# Patient Record
Sex: MALE | Race: BLACK OR AFRICAN AMERICAN | NOT HISPANIC OR LATINO | URBAN - METROPOLITAN AREA
[De-identification: names, ages, dates, MRNs, and addresses within clinical notes are randomized per-mention and may not be internally consistent; named-entity substitution may affect disease eponyms.]

---

## 2019-11-06 ENCOUNTER — INPATIENT (INPATIENT)
Facility: HOSPITAL | Age: 49
LOS: 6 days | Discharge: HOME | DRG: 392 | End: 2019-11-13
Attending: HOSPITALIST | Admitting: HOSPITALIST
Payer: MEDICAID

## 2019-11-06 VITALS
RESPIRATION RATE: 18 BRPM | DIASTOLIC BLOOD PRESSURE: 88 MMHG | TEMPERATURE: 98 F | OXYGEN SATURATION: 100 % | SYSTOLIC BLOOD PRESSURE: 116 MMHG | HEART RATE: 90 BPM

## 2019-11-06 PROCEDURE — 71045 X-RAY EXAM CHEST 1 VIEW: CPT | Mod: 26

## 2019-11-06 PROCEDURE — 99285 EMERGENCY DEPT VISIT HI MDM: CPT

## 2019-11-06 RX ORDER — ASPIRIN/CALCIUM CARB/MAGNESIUM 324 MG
162 TABLET ORAL ONCE
Refills: 0 | Status: COMPLETED | OUTPATIENT
Start: 2019-11-06 | End: 2019-11-06

## 2019-11-06 RX ADMIN — Medication 162 MILLIGRAM(S): at 23:43

## 2019-11-06 NOTE — ED PROVIDER NOTE - CLINICAL SUMMARY MEDICAL DECISION MAKING FREE TEXT BOX
48M homeless hx uncontrolled htn p/w chest pressure transferred from [place] for STEMI eval. Cards consulted. repeat EKG more likely c/w jpoint elevation/early repol. Will get repeat trops, cxr, labs, likely a/m. Low suspicion for dissection Dr. George (Attending Physician)  48M homeless hx uncontrolled htn p/w chest pressure transferred from [place] for STEMI eval. Cards consulted. repeat EKG more likely c/w jpoint elevation/early repol. Will get repeat trops, cxr, labs, likely a/m. Low suspicion for dissection

## 2019-11-06 NOTE — ED PROVIDER NOTE - OBJECTIVE STATEMENT
48M hx htn (uncontrolled) tx from [facility] with STEMI consult. Per EMS, patient presnted to OSH with dizziness. Was found to have ST elevations in v2-v4 and had trop of .084. Was given nitro, lopressor and sent to [facility]. Given 2 baby asa en route. In the ER, patient reports mid chest pressure radiating to neck a/w nausea and sob. Reports occasional smoker but otherwise no drug use. Per EMS, patient is homeless.

## 2019-11-06 NOTE — ED ADULT NURSE NOTE - OBJECTIVE STATEMENT
48y Male A&Ox3 came by EMS from [facility] c/o chest pain. PMH of HTN.  Pt states he has had chest pain for 1 week and started to have worsening CP starting 3pm today with SOB and dizziness. Pt received Nitroglycerin, lopressor and Zofran at [place], Pt also got 2 ASA en route by EMS.  Pt presents with generalized burning 7/10 chest pain that radiates to back of neck and dizziness. Denies sob now, ha, n/v/d, abdominal pain, f/c, urinary symptoms, hematuria. Upon examination, full facial symmetry, no JVD or trach deviation, lung sounds clear and adequate chest rise, S1 and S2 heart sounds present, +2 pulses in all extremities with full ROM and equal strength, cap refill <2 seconds, ABD soft, non distended and non tender, ABD sounds present, pelvis intact, Pt reports normal bowel movement

## 2019-11-06 NOTE — ED PROVIDER NOTE - ATTENDING CONTRIBUTION TO CARE
Dr. George (Attending Physician)  I performed a history and physical exam of the patient and discussed their management with the resident. I reviewed the resident's note and agree with the documented findings and plan of care. My medical decision making and observations are found above.

## 2019-11-07 DIAGNOSIS — Z02.9 ENCOUNTER FOR ADMINISTRATIVE EXAMINATIONS, UNSPECIFIED: ICD-10-CM

## 2019-11-07 DIAGNOSIS — E78.5 HYPERLIPIDEMIA, UNSPECIFIED: ICD-10-CM

## 2019-11-07 DIAGNOSIS — K21.9 GASTRO-ESOPHAGEAL REFLUX DISEASE WITHOUT ESOPHAGITIS: ICD-10-CM

## 2019-11-07 DIAGNOSIS — R07.9 CHEST PAIN, UNSPECIFIED: ICD-10-CM

## 2019-11-07 DIAGNOSIS — N17.9 ACUTE KIDNEY FAILURE, UNSPECIFIED: ICD-10-CM

## 2019-11-07 DIAGNOSIS — I10 ESSENTIAL (PRIMARY) HYPERTENSION: ICD-10-CM

## 2019-11-07 DIAGNOSIS — Z98.890 OTHER SPECIFIED POSTPROCEDURAL STATES: Chronic | ICD-10-CM

## 2019-11-07 DIAGNOSIS — F10.10 ALCOHOL ABUSE, UNCOMPLICATED: ICD-10-CM

## 2019-11-07 DIAGNOSIS — Z29.9 ENCOUNTER FOR PROPHYLACTIC MEASURES, UNSPECIFIED: ICD-10-CM

## 2019-11-07 LAB
ALBUMIN SERPL ELPH-MCNC: 3.8 G/DL — SIGNIFICANT CHANGE UP (ref 3.3–5)
ALBUMIN SERPL ELPH-MCNC: 4 G/DL — SIGNIFICANT CHANGE UP (ref 3.3–5)
ALP SERPL-CCNC: 79 U/L — SIGNIFICANT CHANGE UP (ref 40–120)
ALP SERPL-CCNC: 86 U/L — SIGNIFICANT CHANGE UP (ref 40–120)
ALT FLD-CCNC: 22 U/L — SIGNIFICANT CHANGE UP (ref 10–45)
ALT FLD-CCNC: 28 U/L — SIGNIFICANT CHANGE UP (ref 10–45)
ANION GAP SERPL CALC-SCNC: 11 MMOL/L — SIGNIFICANT CHANGE UP (ref 5–17)
ANION GAP SERPL CALC-SCNC: 14 MMOL/L — SIGNIFICANT CHANGE UP (ref 5–17)
ANION GAP SERPL CALC-SCNC: 15 MMOL/L — SIGNIFICANT CHANGE UP (ref 5–17)
APTT BLD: 30.4 SEC — SIGNIFICANT CHANGE UP (ref 27.5–36.3)
AST SERPL-CCNC: 37 U/L — SIGNIFICANT CHANGE UP (ref 10–40)
AST SERPL-CCNC: 59 U/L — HIGH (ref 10–40)
BILIRUB DIRECT SERPL-MCNC: 0.1 MG/DL — SIGNIFICANT CHANGE UP (ref 0–0.2)
BILIRUB INDIRECT FLD-MCNC: 0.4 MG/DL — SIGNIFICANT CHANGE UP (ref 0.2–1)
BILIRUB SERPL-MCNC: 0.4 MG/DL — SIGNIFICANT CHANGE UP (ref 0.2–1.2)
BILIRUB SERPL-MCNC: 0.5 MG/DL — SIGNIFICANT CHANGE UP (ref 0.2–1.2)
BUN SERPL-MCNC: 10 MG/DL — SIGNIFICANT CHANGE UP (ref 7–23)
BUN SERPL-MCNC: 10 MG/DL — SIGNIFICANT CHANGE UP (ref 7–23)
BUN SERPL-MCNC: 9 MG/DL — SIGNIFICANT CHANGE UP (ref 7–23)
CALCIUM SERPL-MCNC: 8.8 MG/DL — SIGNIFICANT CHANGE UP (ref 8.4–10.5)
CALCIUM SERPL-MCNC: 8.9 MG/DL — SIGNIFICANT CHANGE UP (ref 8.4–10.5)
CALCIUM SERPL-MCNC: 9 MG/DL — SIGNIFICANT CHANGE UP (ref 8.4–10.5)
CHLORIDE SERPL-SCNC: 100 MMOL/L — SIGNIFICANT CHANGE UP (ref 96–108)
CHLORIDE SERPL-SCNC: 100 MMOL/L — SIGNIFICANT CHANGE UP (ref 96–108)
CHLORIDE SERPL-SCNC: 101 MMOL/L — SIGNIFICANT CHANGE UP (ref 96–108)
CHOLEST SERPL-MCNC: 181 MG/DL — SIGNIFICANT CHANGE UP (ref 10–199)
CO2 SERPL-SCNC: 25 MMOL/L — SIGNIFICANT CHANGE UP (ref 22–31)
CO2 SERPL-SCNC: 25 MMOL/L — SIGNIFICANT CHANGE UP (ref 22–31)
CO2 SERPL-SCNC: 26 MMOL/L — SIGNIFICANT CHANGE UP (ref 22–31)
CREAT SERPL-MCNC: 1.13 MG/DL — SIGNIFICANT CHANGE UP (ref 0.5–1.3)
CREAT SERPL-MCNC: 1.24 MG/DL — SIGNIFICANT CHANGE UP (ref 0.5–1.3)
CREAT SERPL-MCNC: 1.35 MG/DL — HIGH (ref 0.5–1.3)
ETHANOL SERPL-MCNC: 19 MG/DL — HIGH (ref 0–10)
GLUCOSE SERPL-MCNC: 106 MG/DL — HIGH (ref 70–99)
GLUCOSE SERPL-MCNC: 91 MG/DL — SIGNIFICANT CHANGE UP (ref 70–99)
GLUCOSE SERPL-MCNC: 91 MG/DL — SIGNIFICANT CHANGE UP (ref 70–99)
HCT VFR BLD CALC: 32.6 % — LOW (ref 39–50)
HDLC SERPL-MCNC: 48 MG/DL — SIGNIFICANT CHANGE UP
HGB BLD-MCNC: 11.5 G/DL — LOW (ref 13–17)
INR BLD: 1.03 RATIO — SIGNIFICANT CHANGE UP (ref 0.88–1.16)
LIDOCAIN IGE QN: 32 U/L — SIGNIFICANT CHANGE UP (ref 7–60)
LIPID PNL WITH DIRECT LDL SERPL: 116 MG/DL — HIGH
MAGNESIUM SERPL-MCNC: 2.1 MG/DL — SIGNIFICANT CHANGE UP (ref 1.6–2.6)
MAGNESIUM SERPL-MCNC: 2.1 MG/DL — SIGNIFICANT CHANGE UP (ref 1.6–2.6)
MCHC RBC-ENTMCNC: 33.6 PG — SIGNIFICANT CHANGE UP (ref 27–34)
MCHC RBC-ENTMCNC: 35.3 GM/DL — SIGNIFICANT CHANGE UP (ref 32–36)
MCV RBC AUTO: 95.3 FL — SIGNIFICANT CHANGE UP (ref 80–100)
NRBC # BLD: 0 /100 WBCS — SIGNIFICANT CHANGE UP (ref 0–0)
NT-PROBNP SERPL-SCNC: 209 PG/ML — SIGNIFICANT CHANGE UP (ref 0–300)
PHOSPHATE SERPL-MCNC: 2.3 MG/DL — LOW (ref 2.5–4.5)
PHOSPHATE SERPL-MCNC: 3.3 MG/DL — SIGNIFICANT CHANGE UP (ref 2.5–4.5)
PLATELET # BLD AUTO: 232 K/UL — SIGNIFICANT CHANGE UP (ref 150–400)
POTASSIUM SERPL-MCNC: 3.3 MMOL/L — LOW (ref 3.5–5.3)
POTASSIUM SERPL-MCNC: 3.6 MMOL/L — SIGNIFICANT CHANGE UP (ref 3.5–5.3)
POTASSIUM SERPL-MCNC: 4.3 MMOL/L — SIGNIFICANT CHANGE UP (ref 3.5–5.3)
POTASSIUM SERPL-SCNC: 3.3 MMOL/L — LOW (ref 3.5–5.3)
POTASSIUM SERPL-SCNC: 3.6 MMOL/L — SIGNIFICANT CHANGE UP (ref 3.5–5.3)
POTASSIUM SERPL-SCNC: 4.3 MMOL/L — SIGNIFICANT CHANGE UP (ref 3.5–5.3)
PROT SERPL-MCNC: 7.2 G/DL — SIGNIFICANT CHANGE UP (ref 6–8.3)
PROT SERPL-MCNC: 7.2 G/DL — SIGNIFICANT CHANGE UP (ref 6–8.3)
PROTHROM AB SERPL-ACNC: 11.8 SEC — SIGNIFICANT CHANGE UP (ref 10–12.9)
RBC # BLD: 3.42 M/UL — LOW (ref 4.2–5.8)
RBC # FLD: 13.8 % — SIGNIFICANT CHANGE UP (ref 10.3–14.5)
SODIUM SERPL-SCNC: 137 MMOL/L — SIGNIFICANT CHANGE UP (ref 135–145)
SODIUM SERPL-SCNC: 140 MMOL/L — SIGNIFICANT CHANGE UP (ref 135–145)
SODIUM SERPL-SCNC: 140 MMOL/L — SIGNIFICANT CHANGE UP (ref 135–145)
TOTAL CHOLESTEROL/HDL RATIO MEASUREMENT: 3.8 RATIO — SIGNIFICANT CHANGE UP (ref 3.4–9.6)
TRIGL SERPL-MCNC: 84 MG/DL — SIGNIFICANT CHANGE UP (ref 10–149)
TROPONIN T, HIGH SENSITIVITY RESULT: 26 NG/L — SIGNIFICANT CHANGE UP (ref 0–51)
TROPONIN T, HIGH SENSITIVITY RESULT: 28 NG/L — SIGNIFICANT CHANGE UP (ref 0–51)
TSH SERPL-MCNC: 3.33 UIU/ML — SIGNIFICANT CHANGE UP (ref 0.27–4.2)
WBC # BLD: 5.32 K/UL — SIGNIFICANT CHANGE UP (ref 3.8–10.5)
WBC # FLD AUTO: 5.32 K/UL — SIGNIFICANT CHANGE UP (ref 3.8–10.5)

## 2019-11-07 PROCEDURE — 99255 IP/OBS CONSLTJ NEW/EST HI 80: CPT

## 2019-11-07 PROCEDURE — 93306 TTE W/DOPPLER COMPLETE: CPT | Mod: 26

## 2019-11-07 PROCEDURE — 12345: CPT | Mod: NC

## 2019-11-07 PROCEDURE — 99223 1ST HOSP IP/OBS HIGH 75: CPT

## 2019-11-07 RX ORDER — POTASSIUM CHLORIDE 20 MEQ
40 PACKET (EA) ORAL ONCE
Refills: 0 | Status: COMPLETED | OUTPATIENT
Start: 2019-11-07 | End: 2019-11-07

## 2019-11-07 RX ORDER — THIAMINE MONONITRATE (VIT B1) 100 MG
100 TABLET ORAL DAILY
Refills: 0 | Status: COMPLETED | OUTPATIENT
Start: 2019-11-07 | End: 2019-11-09

## 2019-11-07 RX ORDER — FOLIC ACID 0.8 MG
1 TABLET ORAL DAILY
Refills: 0 | Status: DISCONTINUED | OUTPATIENT
Start: 2019-11-07 | End: 2019-11-11

## 2019-11-07 RX ORDER — ASPIRIN/CALCIUM CARB/MAGNESIUM 324 MG
81 TABLET ORAL DAILY
Refills: 0 | Status: DISCONTINUED | OUTPATIENT
Start: 2019-11-07 | End: 2019-11-12

## 2019-11-07 RX ORDER — PANTOPRAZOLE SODIUM 20 MG/1
40 TABLET, DELAYED RELEASE ORAL
Refills: 0 | Status: DISCONTINUED | OUTPATIENT
Start: 2019-11-07 | End: 2019-11-13

## 2019-11-07 RX ORDER — FAMOTIDINE 10 MG/ML
20 INJECTION INTRAVENOUS ONCE
Refills: 0 | Status: COMPLETED | OUTPATIENT
Start: 2019-11-07 | End: 2019-11-07

## 2019-11-07 RX ADMIN — Medication 100 MILLIGRAM(S): at 13:02

## 2019-11-07 RX ADMIN — Medication 40 MILLIEQUIVALENT(S): at 07:43

## 2019-11-07 RX ADMIN — Medication 81 MILLIGRAM(S): at 13:02

## 2019-11-07 RX ADMIN — Medication 30 MILLILITER(S): at 01:35

## 2019-11-07 RX ADMIN — PANTOPRAZOLE SODIUM 40 MILLIGRAM(S): 20 TABLET, DELAYED RELEASE ORAL at 07:43

## 2019-11-07 RX ADMIN — FAMOTIDINE 20 MILLIGRAM(S): 10 INJECTION INTRAVENOUS at 01:36

## 2019-11-07 RX ADMIN — Medication 1 TABLET(S): at 13:02

## 2019-11-07 RX ADMIN — Medication 1 MILLIGRAM(S): at 13:02

## 2019-11-07 RX ADMIN — Medication 40 MILLIEQUIVALENT(S): at 13:02

## 2019-11-07 NOTE — CHART NOTE - NSCHARTNOTEFT_GEN_A_CORE
History, labs, chart reviewed. Patient seen and examined. See full note for further details.    Tele strip reviewed: Since 11:30 PM, no tele events, has been in SR in the 80s.  EKG with no ischemic changes.  Cardiac enzymes not consistent with ACS.    Will d/c tele at this time.

## 2019-11-07 NOTE — ED ADULT NURSE REASSESSMENT NOTE - NS ED NURSE REASSESS COMMENT FT1
Rayray ORTA at bedside, MD states Pt doesn't need 4 AM Troponin since 2 AM was drawn.  Pt calm and cooperative

## 2019-11-07 NOTE — H&P ADULT - NSHPREVIEWOFSYSTEMS_GEN_ALL_CORE
CONSTITUTIONAL: No weakness, fevers or chills  EYES/ENT: No visual changes;  No dysphagia  NECK: No pain or stiffness  RESPIRATORY: No cough, wheezing, hemoptysis; No shortness of breath  CARDIOVASCULAR: No chest pain or palpitations; No lower extremity edema  EXTREMITIES: no le edema, cyanosis, clubbing  MUSCULOSKELETAL: no joint pain, swelling  GASTROINTESTINAL: +reflux; vomiting x3; no hematemesis; No diarrhea or constipation. No melena or hematochezia.  BACK: No back pain  GENITOURINARY: No dysuria, frequency or hematuria  NEUROLOGICAL: +dizziness  SKIN: No itching, burning, rashes, or lesions   PSYCH: no agitation  All other review of systems is negative unless indicated above.

## 2019-11-07 NOTE — CHART NOTE - NSCHARTNOTEFT_GEN_A_CORE
Patient seen and examined. History, labs, chart reviewed.    Currently patient reports left-sided/midsternal/epigastric "burning" pain. No nausea/vomiting. No palpitations. No dyspnea. Symptoms do not get worse with exertion and get better after eating. Reports daily EtOH use (1/2 pint vodka/day), with last drink 4 days ago (had positive EtOH level on arrival). States he has no history of cardiac stents and has been off all medications for some time.    On exam, he is hemodynamically stable and not tachycardic.  There is b/l pterygium noted on the eyes. No scleral icterus.  No JVD and and no carotid bruit.  Lungs are CTA b/l. There is no wheeze.  +S1S2. RRR. No m/g/r.  Abdomen soft, non-tender, non-distended. No rebound/guarding.  No clubbing, cyanosis or edema.  No tremor noted; moving all extremities.    Tele strip reviewed from 1130PM yesterday until now: SR without ectopy.  Cardiology evaluation noted. Plan of care discussed with cardiology f/u, Dr. Hand, as well.    A/P:  1. Chest Pain: Transferred from OSH for reported ST elevations on EKG and mild Troponin I elevation. Here, EKG without ischemic changes and hsTnT testing not consistent with ACS. Suspect that chest pain may be GI in nature given EtOH use and report of burning in chest that gets better after eating.  -D/C telemetry monitoring.  -Obtain TTE; history is suspicious for CHF, which may be non-ischemic in origin (?EtOH use).  -Continue ASA 81 mg PO daily for now, but question utility as this may be for primary prevention. Will need collateral information to see if there truly is a history of CAD.  -BP monitoring.  -Trial of PPI/Maalox    2. EtOH use: No signs of withdrawal at this time but given significant history of daily use, suspect his is at risk for such.  -Continue symptom-triggered CIWA monitoring.  -MVI/folate/B12.  -Social work evaluation. Pt also reportedly undomiciled.    3. Hypokalemia in setting of ethanol use  -Replete with KCl 40 mEq PO x 1 now.  -Repeat BMP; if persists, will check magnesium level as well.    4. DVT PPx: IMPROVE score is 0. Ambulation as tolerated. No role for pharmacologic DVT PPx at this time.

## 2019-11-07 NOTE — H&P ADULT - PROBLEM SELECTOR PLAN 6
low risk for DVT, hold off AC inconsistent history  start ciwa monitoring  check alcohol level  check lipase inconsistent history  start ciwa monitoring  check alcohol level  check lipase  thaimine/folate/MVI

## 2019-11-07 NOTE — H&P ADULT - PROBLEM SELECTOR PLAN 8
1.  Name of PCP: No PCP  2.  PCP Contacted on Admission: [ ] Y    [x ] N    3.  PCP contacted at Discharge: [ ] Y    [ ] N    [ ] N/A  4.  Post-Discharge Appointment Date and Location:  5.  Summary of Handoff given to PCP:

## 2019-11-07 NOTE — CONSULT NOTE ADULT - SUBJECTIVE AND OBJECTIVE BOX
All Cardiology service information can be found 24/7 on [email or web address], password: [email or web address]    Patient seen and evaluated at bedside    Chief Complaint: dizziness    HPI:  48 M w/ no known PMH initially presented to OSH with complaints of dizziness. EKG there revealed ROXANN in I and AVL w/ troponin I elevation of .08 and patient transferred for further eval. He reports chest pain burning in nature that upon further elucidation was localized to his epigastrium. Denies any palpitations or SOB. Drinks half a pint of vodka frequently although pt unable to further quantify. Lives in a shelter and does not obtain routine medical care    PMHx:   None    PSHx:   None    Allergies:  No Known Allergies      Home Meds: None    Current Medications:       FAMILY HISTORY: non-contributory      Social History:  +3-5 cigs/day, frequent drinker, denies other drug use    REVIEW OF SYSTEMS:  CONSTITUTIONAL: No fevers or chills  EYES/ENT: No visual changes;  No dysphagia  NECK: No pain or stiffness  RESPIRATORY: No cough, wheezing, hemoptysis; No shortness of breath  CARDIOVASCULAR: No chest pain or palpitations; No lower extremity edema  GASTROINTESTINAL: +epigastric discomfort   BACK: No back pain  GENITOURINARY: No dysuria, frequency or hematuria  NEUROLOGICAL: No numbness or weakness  SKIN: No itching, burning, rashes, or lesions   All other review of systems is negative unless indicated above.    Physical Exam:  T(F): --  HR: 90 (11-06) (90 - 90)  BP: --  RR: 18 (11-06)  SpO2: 100% (11-06)  GENERAL: No acute distress  HEAD:  Atraumatic, Normocephalic  ENT: Neck supple, no JVD  CHEST/LUNG: Clear to auscultation bilaterally; No wheeze, equal breath sounds bilaterally   BACK: No spinal tenderness  HEART: Regular rate and rhythm; No murmurs, rubs, or gallops  ABDOMEN: Soft, Nontender, Nondistended; Bowel sounds present  EXTREMITIES:  No clubbing, cyanosis, or edema  PSYCH: Nl behavior, nl affect  NEUROLOGY: non-focal, cranial nerves intact  SKIN: Normal color, No rashes or lesions  LINES:    Cardiovascular Diagnostic Testing:    ECG: Personally reviewed: NSR, LVH, repolarization abnormalities    Labs: Personally reviewed                        11.5 5.32  )-----------( 232      ( 06 Nov 2019 23:52 )             32.6     11-06    140  |  100  |  10  ----------------------------<  91  3.6   |  26  |  1.35<H>    Ca    9.0      06 Nov 2019 23:52    TPro  7.2  /  Alb  3.8  /  TBili  0.4  /  DBili  x   /  AST  37  /  ALT  22  /  AlkPhos  79  11-06    PT/INR - ( 06 Nov 2019 23:52 )   PT: 11.8 sec;   INR: 1.03 ratio         PTT - ( 06 Nov 2019 23:52 )  PTT:30.4 sec    CARDIAC MARKERS ( 06 Nov 2019 23:52 )  26 ng/L / x     / x     / x     / x     / x            Serum Pro-Brain Natriuretic Peptide: 209 pg/mL (11-06 @ 23:52) All Cardiology service information can be found 24/7 on [email or web address], password: [email or web address]    Patient seen and evaluated at bedside    Chief Complaint: dizziness    HPI:  48 M w/ no known PMH initially presented to OSH with complaints of dizziness. EKG there revealed ROXANN in I and AVL w/ troponin I elevation of .08 and patient transferred for further eval. He reports chest pain burning in nature that upon further elucidation was localized to his epigastrium. Denies any palpitations or SOB. Drinks half a pint of vodka frequently although pt unable to further quantify. Lives in a shelter and does not obtain routine medical care.    PMHx:   None    PSHx:   None    Allergies:  No Known Allergies    Home Meds: None    FAMILY HISTORY: non-contributory    Social History:  +3-5 cigs/day, frequent drinker, denies other drug use    REVIEW OF SYSTEMS:  CONSTITUTIONAL: No fevers or chills  EYES/ENT: No visual changes;  No dysphagia  NECK: No pain or stiffness  RESPIRATORY: No cough, wheezing, hemoptysis; No shortness of breath  CARDIOVASCULAR: No chest pain or palpitations; No lower extremity edema  GASTROINTESTINAL: +epigastric discomfort   BACK: No back pain  GENITOURINARY: No dysuria, frequency or hematuria  NEUROLOGICAL: No numbness or weakness  SKIN: No itching, burning, rashes, or lesions   All other review of systems is negative unless indicated above.    Physical Exam:  T(F): --  HR: 90 (11-06) (90 - 90)  BP: --  RR: 18 (11-06)  SpO2: 100% (11-06)  GENERAL: No acute distress  HEAD:  Atraumatic, Normocephalic  ENT: Neck supple, no JVD  CHEST/LUNG: Clear to auscultation bilaterally; No wheeze, equal breath sounds bilaterally   BACK: No spinal tenderness  HEART: Regular rate and rhythm; No murmurs, rubs, or gallops  ABDOMEN: Soft, Nontender, Nondistended; Bowel sounds present  EXTREMITIES:  No clubbing, cyanosis, or edema  PSYCH: Nl behavior, nl affect  NEUROLOGY: non-focal, cranial nerves intact  SKIN: Normal color, No rashes or lesions  LINES:    Cardiovascular Diagnostic Testing:    ECG: Personally reviewed: NSR, LVH, repolarization abnormalities    Labs: Personally reviewed                        11.5   5.32  )-----------( 232      ( 06 Nov 2019 23:52 )             32.6     11-06    140  |  100  |  10  ----------------------------<  91  3.6   |  26  |  1.35<H>    Ca    9.0      06 Nov 2019 23:52    TPro  7.2  /  Alb  3.8  /  TBili  0.4  /  DBili  x   /  AST  37  /  ALT  22  /  AlkPhos  79  11-06    PT/INR - ( 06 Nov 2019 23:52 )   PT: 11.8 sec;   INR: 1.03 ratio       PTT - ( 06 Nov 2019 23:52 )  PTT:30.4 sec    CARDIAC MARKERS ( 06 Nov 2019 23:52 )  26 ng/L / x     / x     / x     / x     / x        Serum Pro-brain Natriuretic Peptide: 209 pg/mL (11-06 @ 23:52)

## 2019-11-07 NOTE — H&P ADULT - PROBLEM SELECTOR PLAN 1
Patient with atypical chest pain, possibly dyspepsia vs ACS  hstrop 26 --> 28  monitor on telemetry for now  start maalox and protonix for now  check echocardiogram  hold off stress test for now per cardiology  will try to obtain outpatient records  check alcohol level and lipase  start aspirin for now  check lipid panel, A1c, TSH Patient with atypical chest pain, possibly dyspepsia vs ACS vs. Pancreatitis (?alcohol use)  hstrop 26 --> 28  monitor on telemetry for now  start maalox and protonix for now  check echocardiogram  hold off stress test for now per cardiology  will try to obtain outpatient records  check alcohol level and lipase  start aspirin for now  check lipid panel, A1c, TSH

## 2019-11-07 NOTE — H&P ADULT - ATTENDING COMMENTS
Patient assigned to me by night hospitalist in charge for management and care for patient for this evening only. Care to be resumed by day hospitalist in the morning and thereafter.     Patient's care rendered on 11/07/19 at 4AM.      I was physically present for the key portions of the evaluation and management (E/M) service provided.  I agree with the above history, physical, and plan which I have reviewed and edited where appropriate.     Plan discussed with Patient, RN, Cardiology Fellow.

## 2019-11-07 NOTE — H&P ADULT - PROBLEM SELECTOR PLAN 7
1.  Name of PCP: No PCP  2.  PCP Contacted on Admission: [ ] Y    [x ] N    3.  PCP contacted at Discharge: [ ] Y    [ ] N    [ ] N/A  4.  Post-Discharge Appointment Date and Location:  5.  Summary of Handoff given to PCP: low risk for DVT, hold off AC

## 2019-11-07 NOTE — H&P ADULT - HISTORY OF PRESENT ILLNESS
47 yo male with PMH of HTN, Reflux, ?CAD, ?CHF, presents here with chest pain.  Patient is no a reliable historian, providing conflicting histories.  Patient states that he was admitted to [facility] 6 months ago for chest pain, had echo, stress test and cardiac cath, all of which were normal.  He was apparently discharged with five different medication because his hear "does not pump well" and "does not beat properly".  Four months ago he ran out of medication.  Currently he is unemployed and lives in a shelter.  He states that for last 1-2 months he has been having intermittent chest pain.  He describes it as "burning" type sensation, radiating to right side of his neck.  He says symptoms occur randomly at rest and with activities.  He also has SOB with activities, sleeps on 1 pillow, occasionally wakes up from sleep 2/2 SOB.  Denies any LE edema.  For last three days he has been having dizziness, had a total of 3 episodes of vomiting.  He went to "[facility]" and then he was sent here for positive troponin and ROXANN AVL and I.  On arrival, patient still complained of same symptoms.  On my evaluation, patient was resting comfortably, but when asked, he says he is still having same symptoms.  Per cardiology, he says he drinks regularly, last drink was 3 days ago, however he told me his last drink was two weeks ago. 47 yo male with PMH of HTN, Reflux, ?CAD, ?CHF, presents here with chest pain.  Patient is not a reliable historian, providing conflicting histories.  Patient states that he was admitted to [facility] 6 months ago for chest pain, had echo, stress test and cardiac cath, all of which were normal.  He was apparently discharged with five different medication because his heart "does not pump well" and "does not beat properly".  Four months ago he ran out of medication.  Currently he is unemployed and lives in a shelter.  He states that for last 1-2 months he has been having intermittent chest pain.  He describes it as "burning" type sensation, radiating to right side of his neck.  He says symptoms occur randomly at rest and with activities.  Denies associated diaphoresis.  He also has SOB with activities, sleeps on 1 pillow, occasionally wakes up from sleep 2/2 SOB.  Denies any LE edema.  For last three days he has been having dizziness, had a total of 3 episodes of vomiting.  He went to "[facility]" and then he was sent here for positive troponin and ROXANN AVL and I.  On arrival, patient still complained of same symptoms.  On my evaluation, patient was resting comfortably, but when asked, he says he is still having same symptoms.  Per cardiology, he says he drinks regularly, last drink was 3 days ago, however he told me his last drink was two weeks ago.  Otherwise, patient denies SOB, cough, fever.

## 2019-11-07 NOTE — CONSULT NOTE ADULT - ASSESSMENT
48 M w/ no known PMH presented w/ dizziness transferred for STEMI eval although likely repolarization abnormalities.    #Dizziness  -May be related to his alcohol use rather than cardiogenic. Check TTE  -Doubt acute ischemic process    #Epigastric discomfort  -Consider GI cocktail, atypical symptoms for angina  -Trend troponin 1 more set    Victor M Hand PGY4  [phone]  All Cardiology service information can be found 24/7 on [email or web address], password: [email or web address]

## 2019-11-07 NOTE — H&P ADULT - NSHPPHYSICALEXAM_GEN_ALL_CORE
PHYSICAL EXAM:  Vital Signs Last 24 Hrs  T(C): 36.8 (11-07-19 @ 02:14)  T(F): 98.2 (11-07-19 @ 02:14), Max: 98.4 (11-06-19 @ 23:20)  HR: 88 (11-07-19 @ 02:14) (88 - 90)  BP: 113/87 (11-07-19 @ 02:14)  BP(mean): --  RR: 18 (11-07-19 @ 02:14) (18 - 18)  SpO2: 97% (11-07-19 @ 02:14) (97% - 100%)  Wt(kg): --    Constitutional: NAD, awake and alert  EYES: EOMI; left eye conjunctival injection (chronic)  ENT:  Normal Hearing, no tonsillar exudates   Neck: Soft and supple, No JVD  Lungs: Breath sounds are clear bilaterally, No wheezing, rales or rhonchi  Heart: S1 and S2, regular rate and rhythm, no Murmurs, gallops or rubs  Abdomen: Bowel Sounds present, soft, nontender, nondistended, no guarding, no rebound  Extremities: No cyanosis or clubbing; warm to touch  Vascular: 2+ peripheral pulses lower ex  Neurological: A/O x 3, no focal deficits  Musculoskeletal: 5/5 strength b/l upper and lower extremities  Skin: No rashes  Psych: no depression or anhedonia  HEME: no bruises, no nose bleeds

## 2019-11-07 NOTE — H&P ADULT - NSHPLABSRESULTS_GEN_ALL_CORE
Labs personally reviewed:                          11.5   5.32  )-----------( 232      ( 06 Nov 2019 23:52 )             32.6     11-06    140  |  100  |  10  ----------------------------<  91  3.6   |  26  |  1.35<H>    Ca    9.0      06 Nov 2019 23:52    TPro  7.2  /  Alb  3.8  /  TBili  0.4  /  DBili  x   /  AST  37  /  ALT  22  /  AlkPhos  79  11-06        LIVER FUNCTIONS - ( 06 Nov 2019 23:52 )  Alb: 3.8 g/dL / Pro: 7.2 g/dL / ALK PHOS: 79 U/L / ALT: 22 U/L / AST: 37 U/L / GGT: x           PT/INR - ( 06 Nov 2019 23:52 )   PT: 11.8 sec;   INR: 1.03 ratio         PTT - ( 06 Nov 2019 23:52 )  PTT:30.4 sec    CAPILLARY BLOOD GLUCOSE          Imaging:  CXR personally reviewed: no focal opacity    EKG personally reviewed: nsr, ?early repolarization, LVH

## 2019-11-07 NOTE — ED ADULT NURSE REASSESSMENT NOTE - NS ED NURSE REASSESS COMMENT FT1
Pt states his chest pain is now a 2/10, medications gave relief. Pt admitted to Telemetry, Troponin collected and sent.  VSS

## 2019-11-07 NOTE — CONSULT NOTE ADULT - SUBJECTIVE AND OBJECTIVE BOX
MRN-[id number]    CHIEF COMPLAINT:  Patient is a 48y old  Male who presents with a chief complaint of chest pain and dizziness (07 Nov 2019 04:24)    NOT cardiac in nature. Treat alternative sources of discomfort; highly suspicious for GI. No plans for ischemic workup or stress testing. ECHO reviewed. Discharge with outpatient follow up when other medical and social issues have been addressed. Complete note to follow.     HISTORY OF PRESENT ILLNESS:  BELKYS MORAN is a 48y Male patient with past medical history of *** presenting with ***.     Allergies    No Known Allergies    PAST MEDICAL & SURGICAL HISTORY:  Acid reflux disease  HLD (hyperlipidemia)  HTN (hypertension)  H/O coronary angiogram      FAMILY HISTORY:  FH: heart disease: father @age 70      SOCIAL HISTORY:    [ ] Non-smoker  [ ] Smoker  [ ] Alcohol    REVIEW OF SYSTEMS:  CONSTITUTIONAL: No fever, weight loss, or fatigue  EYES: No eye pain, visual disturbances, or discharge  ENMT:  No difficulty hearing, tinnitus, vertigo; No sinus or throat pain  NECK: No pain or stiffness  RESPIRATORY: No cough, wheezing, chills or hemoptysis; No Shortness of Breath  CARDIOVASCULAR: No chest pain, palpitations, passing out, dizziness, or leg swelling  GASTROINTESTINAL: No abdominal or epigastric pain. No nausea, vomiting, or hematemesis; No diarrhea or constipation. No melena or hematochezia.  GENITOURINARY: No dysuria, frequency, hematuria, or incontinence  NEUROLOGICAL: No headaches, memory loss, loss of strength, numbness, or tremors  SKIN: No itching, burning, rashes, or lesions   LYMPH Nodes: No enlarged glands  ENDOCRINE: No heat or cold intolerance; No hair loss  MUSCULOSKELETAL: No joint pain or swelling; No muscle, back, or extremity pain  PSYCHIATRIC: No depression, anxiety, mood swings, or difficulty sleeping  HEME/LYMPH: No easy bruising, or bleeding gums  ALLERY AND IMMUNOLOGIC: No hives or eczema	    [ ] All others negative	  [ ] Unable to obtain    I&O's Summary      PHYSICAL EXAM:  Vital Signs Last 24 Hrs  T(C): 36.7 (07 Nov 2019 15:19), Max: 37.2 (07 Nov 2019 11:39)  T(F): 98.1 (07 Nov 2019 15:19), Max: 99 (07 Nov 2019 11:39)  HR: 102 (07 Nov 2019 15:19) (83 - 102)  BP: 135/87 (07 Nov 2019 15:19) (113/87 - 146/98)  BP(mean): --  RR: 18 (07 Nov 2019 15:19) (16 - 18)  SpO2: 98% (07 Nov 2019 15:19) (96% - 100%)  Appearance: Normal	  HEENT:   Normal oral mucosa, PERRL, EOMI	  Lymphatic: No lymphadenopathy  Cardiovascular: Normal S1 S2, No JVD, No murmurs, No edema  Respiratory: Lungs clear to auscultation	  Psychiatry: A & O x 3, Mood & affect appropriate  Gastrointestinal:  Soft, Non-tender, + BS	  Skin: No rashes, No ecchymoses, No cyanosis	  Neurologic: Non-focal  Extremities: Normal range of motion, No clubbing, cyanosis or edema  Vascular: Peripheral pulses palpable 2+ bilaterally    MEDICATIONS:  MEDICATIONS  (STANDING):  aspirin enteric coated 81 milliGRAM(s) Oral daily  folic acid 1 milliGRAM(s) Oral daily  multivitamin 1 Tablet(s) Oral daily  pantoprazole    Tablet 40 milliGRAM(s) Oral before breakfast  thiamine 100 milliGRAM(s) Oral daily    MEDICATIONS  (PRN):  aluminum hydroxide/magnesium hydroxide/simethicone Suspension 30 milliLiter(s) Oral every 6 hours PRN Dyspepsia  LORazepam     Tablet 2 milliGRAM(s) Oral every 2 hours PRN Symptom-triggered 2 point increase in CIWA-Ar  LORazepam     Tablet 2 milliGRAM(s) Oral every 1 hour PRN Symptom-triggered: each CIWA -Ar score 8 or GREATER      Home Medications:      LABS:	 	  CBC Full  -  ( 06 Nov 2019 23:52 )  WBC Count : 5.32 K/uL  Hemoglobin : 11.5 g/dL  Hematocrit : 32.6 %  Platelet Count - Automated : 232 K/uL  Mean Cell Volume : 95.3 fl  Mean Cell Hemoglobin : 33.6 pg  Mean Cell Hemoglobin Concentration : 35.3 gm/dL  Auto Neutrophil # : x  Auto Lymphocyte # : x  Auto Monocyte # : x  Auto Eosinophil # : x  Auto Basophil # : x  Auto Neutrophil % : x  Auto Lymphocyte % : x  Auto Monocyte % : x  Auto Eosinophil % : x  Auto Basophil % : x    11-07    137  |  101  |  10  ----------------------------<  106<H>  4.3   |  25  |  1.24  11-07    140  |  100  |  9   ----------------------------<  91  3.3<L>   |  25  |  1.13    Ca    8.8      07 Nov 2019 14:40  Ca    8.9      07 Nov 2019 05:28  Phos  2.3     11-07  Phos  3.3     11-07  Mg     2.1     11-07  Mg     2.1     11-07    TPro  7.2  /  Alb  4.0  /  TBili  0.5  /  DBili  0.1  /  AST  59<H>  /  ALT  28  /  AlkPhos  86  11-07  TPro  7.2  /  Alb  3.8  /  TBili  0.4  /  DBili  x   /  AST  37  /  ALT  22  /  AlkPhos  79  11-06    PT/INR - ( 06 Nov 2019 23:52 )   PT: 11.8 sec;   INR: 1.03 ratio         PTT - ( 06 Nov 2019 23:52 )  PTT:30.4 sec        proBNP:   Lipid Profile:   HgA1c:   TSH:     TELEMETRY: 	    ECG:  	  RADIOLOGY:  OTHER: 	    CARDIAC TESTING/STUDIES:    [ ] Echocardiogram:  [ ]  Catheterization:  [ ] Stress Test:  	  	  ASSESSMENT/PLAN: 	  NOT cardiac in nature. Treat alternative sources of discomfort; highly suspicious for GI No plans for ischemic workup or stress testing. ECHO reviewed. Discharge with outpatient follow up when other medical and social issus have been addressed. Complete note to follow.     Leena Stern MD, MPH, FILIBERTO  Cardiovascular Specialist Attending  [facility]  C: [phone]  E: [email or web address]

## 2019-11-07 NOTE — CONSULT NOTE ADULT - ATTENDING COMMENTS
48 M w/ no known PMH presented w/ dizziness while intoxicated transferred for STEMI eval although likely repolarization abnormalities and atypical chest discomfort. Treat alternative sources of discomfort; highly suspicious for GI. No plans for ischemic workup or stress testing. ECHO reviewed. Discharge with outpatient follow up when other medical and social issues have been addressed.    Leena Stern MD, MPH, FILIBERTO, RPVI, FACC  Cardiovascular Specialist   [facility]  c: [phone]  e: [email or web address]

## 2019-11-08 ENCOUNTER — TRANSCRIPTION ENCOUNTER (OUTPATIENT)
Age: 49
End: 2019-11-08

## 2019-11-08 LAB
ANION GAP SERPL CALC-SCNC: 11 MMOL/L — SIGNIFICANT CHANGE UP (ref 5–17)
BUN SERPL-MCNC: 13 MG/DL — SIGNIFICANT CHANGE UP (ref 7–23)
CALCIUM SERPL-MCNC: 8.9 MG/DL — SIGNIFICANT CHANGE UP (ref 8.4–10.5)
CHLORIDE SERPL-SCNC: 100 MMOL/L — SIGNIFICANT CHANGE UP (ref 96–108)
CO2 SERPL-SCNC: 24 MMOL/L — SIGNIFICANT CHANGE UP (ref 22–31)
CREAT SERPL-MCNC: 1.1 MG/DL — SIGNIFICANT CHANGE UP (ref 0.5–1.3)
GLUCOSE SERPL-MCNC: 99 MG/DL — SIGNIFICANT CHANGE UP (ref 70–99)
MAGNESIUM SERPL-MCNC: 2 MG/DL — SIGNIFICANT CHANGE UP (ref 1.6–2.6)
PHOSPHATE SERPL-MCNC: 2.8 MG/DL — SIGNIFICANT CHANGE UP (ref 2.5–4.5)
POTASSIUM SERPL-MCNC: 4 MMOL/L — SIGNIFICANT CHANGE UP (ref 3.5–5.3)
POTASSIUM SERPL-SCNC: 4 MMOL/L — SIGNIFICANT CHANGE UP (ref 3.5–5.3)
SODIUM SERPL-SCNC: 135 MMOL/L — SIGNIFICANT CHANGE UP (ref 135–145)

## 2019-11-08 PROCEDURE — 99232 SBSQ HOSP IP/OBS MODERATE 35: CPT

## 2019-11-08 RX ORDER — ACETAMINOPHEN 500 MG
650 TABLET ORAL ONCE
Refills: 0 | Status: COMPLETED | OUTPATIENT
Start: 2019-11-08 | End: 2019-11-08

## 2019-11-08 RX ADMIN — Medication 1 TABLET(S): at 12:32

## 2019-11-08 RX ADMIN — Medication 650 MILLIGRAM(S): at 21:11

## 2019-11-08 RX ADMIN — PANTOPRAZOLE SODIUM 40 MILLIGRAM(S): 20 TABLET, DELAYED RELEASE ORAL at 07:02

## 2019-11-08 RX ADMIN — Medication 650 MILLIGRAM(S): at 22:00

## 2019-11-08 RX ADMIN — Medication 1 MILLIGRAM(S): at 12:33

## 2019-11-08 RX ADMIN — Medication 100 MILLIGRAM(S): at 12:33

## 2019-11-08 RX ADMIN — Medication 81 MILLIGRAM(S): at 12:32

## 2019-11-08 NOTE — PROGRESS NOTE ADULT - SUBJECTIVE AND OBJECTIVE BOX
Patient is a 48y old  Male who presents with a chief complaint of chest pain and dizziness (07 Nov 2019 17:59)      SUBJECTIVE / OVERNIGHT EVENTS:    MEDICATIONS  (STANDING):  aspirin enteric coated 81 milliGRAM(s) Oral daily  folic acid 1 milliGRAM(s) Oral daily  multivitamin 1 Tablet(s) Oral daily  pantoprazole    Tablet 40 milliGRAM(s) Oral before breakfast  thiamine 100 milliGRAM(s) Oral daily    MEDICATIONS  (PRN):  aluminum hydroxide/magnesium hydroxide/simethicone Suspension 30 milliLiter(s) Oral every 6 hours PRN Dyspepsia  LORazepam     Tablet 2 milliGRAM(s) Oral every 2 hours PRN Symptom-triggered 2 point increase in CIWA-Ar  LORazepam     Tablet 2 milliGRAM(s) Oral every 1 hour PRN Symptom-triggered: each CIWA -Ar score 8 or GREATER      Vital Signs Last 24 Hrs  T(C): 36.7 (08 Nov 2019 07:56), Max: 37.2 (07 Nov 2019 11:39)  T(F): 98 (08 Nov 2019 07:56), Max: 99 (07 Nov 2019 11:39)  HR: 84 (08 Nov 2019 07:56) (84 - 104)  BP: 136/80 (08 Nov 2019 07:56) (119/76 - 147/93)  BP(mean): --  RR: 18 (08 Nov 2019 07:56) (16 - 18)  SpO2: 97% (08 Nov 2019 07:56) (96% - 99%)  CAPILLARY BLOOD GLUCOSE        I&O's Summary    07 Nov 2019 07:01  -  08 Nov 2019 07:00  --------------------------------------------------------  IN: 500 mL / OUT: 0 mL / NET: 500 mL        PHYSICAL EXAM:  GENERAL: NAD, well-developed  HEAD:  Atraumatic, Normocephalic  EYES: EOMI, PERRLA, conjunctiva and sclera clear  NECK: Supple, No JVD  CHEST/LUNG: Clear to auscultation bilaterally; No wheeze  HEART: Regular rate and rhythm; No murmurs, rubs, or gallops  ABDOMEN: Soft, Nontender, Nondistended; Bowel sounds present  EXTREMITIES:  2+ Peripheral Pulses, No clubbing, cyanosis, or edema  PSYCH: AAOx3  NEUROLOGY: non-focal  SKIN: No rashes or lesions    LABS:                        11.5   5.32  )-----------( 232      ( 06 Nov 2019 23:52 )             32.6     11-08    135  |  100  |  13  ----------------------------<  99  4.0   |  24  |  1.10    Ca    8.9      08 Nov 2019 07:47  Phos  2.8     11-08  Mg     2.0     11-08    TPro  7.2  /  Alb  4.0  /  TBili  0.5  /  DBili  0.1  /  AST  59<H>  /  ALT  28  /  AlkPhos  86  11-07    PT/INR - ( 06 Nov 2019 23:52 )   PT: 11.8 sec;   INR: 1.03 ratio         PTT - ( 06 Nov 2019 23:52 )  PTT:30.4 sec          RADIOLOGY & ADDITIONAL TESTS:    Imaging Personally Reviewed:    Consultant(s) Notes Reviewed:      Care Discussed with Consultants/Other Providers: Patient is a 48y old  Male who presents with a chief complaint of chest pain and dizziness (07 Nov 2019 17:59)      SUBJECTIVE / OVERNIGHT EVENTS:  Pt seen and examined. No acute events overnight. Reports that he feels well. Denies cp, sob, dizziness. Seen by Cardiology ; reccs appreciated.     MEDICATIONS  (STANDING):  aspirin enteric coated 81 milliGRAM(s) Oral daily  folic acid 1 milliGRAM(s) Oral daily  multivitamin 1 Tablet(s) Oral daily  pantoprazole    Tablet 40 milliGRAM(s) Oral before breakfast  thiamine 100 milliGRAM(s) Oral daily    MEDICATIONS  (PRN):  aluminum hydroxide/magnesium hydroxide/simethicone Suspension 30 milliLiter(s) Oral every 6 hours PRN Dyspepsia  LORazepam     Tablet 2 milliGRAM(s) Oral every 2 hours PRN Symptom-triggered 2 point increase in CIWA-Ar  LORazepam     Tablet 2 milliGRAM(s) Oral every 1 hour PRN Symptom-triggered: each CIWA -Ar score 8 or GREATER      Vital Signs Last 24 Hrs  T(C): 36.7 (08 Nov 2019 07:56), Max: 37.2 (07 Nov 2019 11:39)  T(F): 98 (08 Nov 2019 07:56), Max: 99 (07 Nov 2019 11:39)  HR: 84 (08 Nov 2019 07:56) (84 - 104)  BP: 136/80 (08 Nov 2019 07:56) (119/76 - 147/93)  BP(mean): --  RR: 18 (08 Nov 2019 07:56) (16 - 18)  SpO2: 97% (08 Nov 2019 07:56) (96% - 99%)  CAPILLARY BLOOD GLUCOSE        I&O's Summary    07 Nov 2019 07:01  -  08 Nov 2019 07:00  --------------------------------------------------------  IN: 500 mL / OUT: 0 mL / NET: 500 mL        PHYSICAL EXAM:  GENERAL: NAD, anicteric, afebrile  HEAD:  Atraumatic, Normocephalic  EYES: EOMI, PERRLA, conjunctiva and sclera clear  NECK: Supple, No JVD  CHEST/LUNG: Clear to auscultation bilaterally; No wheeze  HEART: Regular rate and rhythm; No murmurs, rubs, or gallops  ABDOMEN: Soft, Nontender, Nondistended; Bowel sounds present  EXTREMITIES:  2+ Peripheral Pulses, No clubbing, cyanosis, or edema  PSYCH: AAOx3  NEUROLOGY: non-focal, no tremors noted  SKIN: No rashes or lesions    LABS:                        11.5   5.32  )-----------( 232      ( 06 Nov 2019 23:52 )             32.6     11-08    135  |  100  |  13  ----------------------------<  99  4.0   |  24  |  1.10    Ca    8.9      08 Nov 2019 07:47  Phos  2.8     11-08  Mg     2.0     11-08    TPro  7.2  /  Alb  4.0  /  TBili  0.5  /  DBili  0.1  /  AST  59<H>  /  ALT  28  /  AlkPhos  86  11-07    PT/INR - ( 06 Nov 2019 23:52 )   PT: 11.8 sec;   INR: 1.03 ratio         PTT - ( 06 Nov 2019 23:52 )  PTT:30.4 sec            Consultant(s) Notes Reviewed:  Cardiology

## 2019-11-08 NOTE — DISCHARGE NOTE PROVIDER - NSDCCPCAREPLAN_GEN_ALL_CORE_FT
PRINCIPAL DISCHARGE DIAGNOSIS  Diagnosis: Chest pain  Assessment and Plan of Treatment: atypical chest pain; seen by cardiology      SECONDARY DISCHARGE DIAGNOSES  Diagnosis: Alcohol abuse  Assessment and Plan of Treatment: Alcohol abuse  follow up by  PRINCIPAL DISCHARGE DIAGNOSIS  Diagnosis: Chest pain  Assessment and Plan of Treatment: atypical chest pain; seen by cardiology  Work up was negative      SECONDARY DISCHARGE DIAGNOSES  Diagnosis: Alcohol abuse  Assessment and Plan of Treatment: Alcohol abuse  follow up by  PRINCIPAL DISCHARGE DIAGNOSIS  Diagnosis: Chest pain  Assessment and Plan of Treatment: atypical chest pain; seen by cardiology  Work up was negative  Continue aspirin      SECONDARY DISCHARGE DIAGNOSES  Diagnosis: Alcohol abuse  Assessment and Plan of Treatment: Alcohol abuse  follow up by  PRINCIPAL DISCHARGE DIAGNOSIS  Diagnosis: Chest pain  Assessment and Plan of Treatment: atypical chest pain; seen by cardiology  Work up was negative        SECONDARY DISCHARGE DIAGNOSES  Diagnosis: Alcohol abuse  Assessment and Plan of Treatment: Alcohol abuse  follow up by  PRINCIPAL DISCHARGE DIAGNOSIS  Diagnosis: Chest pain  Assessment and Plan of Treatment: Atypical chest pain; seen by cardiology  Work up was negative.        SECONDARY DISCHARGE DIAGNOSES  Diagnosis: HTN (hypertension)  Assessment and Plan of Treatment: APPOINTMENT MADE FOR YOU AT [facility] ON FRIDAY, NOVEMBER 22ND AT 10 AM.  Follow up with your medical doctor for routine blood pressure monitoring, and to establish long term blood pressure treatment goals.  Low salt diet  Activity as tolerated.  Notify your doctor if you have any of the following symptoms:   Dizziness, Lightheadedness, Blurry vision, Headache, Chest pain, Shortness of breath      Diagnosis: HLD (hyperlipidemia)  Assessment and Plan of Treatment:   Follow up with your medical doctor for routine blood  work monitoring, and to establish long term treatment goals.      Diagnosis: Alcohol abuse  Assessment and Plan of Treatment: Alcohol abuse  Follow up withj   for resources for Alcohol Abstinence..

## 2019-11-08 NOTE — DISCHARGE NOTE PROVIDER - HOSPITAL COURSE
49 yo male with PMH of HTN, Reflux  presents here with chest pain.     Transferred from OSH for reported ST elevations on EKG and mild Troponin I elevation. Here, EKG without ischemic changes and hsTnT testing not consistent with ACS. Suspect that chest pain may be GI in n highly suspicious for GI No plans for ischemic workup or stress testing.     seen by cardiology-    -D/C telemetry monitoring.    seen by ASHA 47 yo male with PMH of HTN, Reflux  presents here with chest pain.     Transferred from OSH for reported ST elevations on EKG and mild Troponin I elevation. Here, EKG without ischemic changes and hsTnT testing not consistent with ACS. Suspect that chest pain may be GI in n highly suspicious for GI No plans for ischemic workup or stress testing.     seen by cardiology-Echo was wnl. D/C telemetry monitoring.    seen by ASHA. Pt to f/u with Medicine clinic in [facility]. 47 yo male with PMH of HTN, Reflux  who presented  with chest pain.     Transferred from OSH for reported ST elevations on EKG and mild Troponin I elevation. Here, EKG without ischemic changes and hsTnT testing not consistent with ACS.     Suspect that chest pain may be GI related and  highly suspicious for GI. Seen by Cardiology-Echo was wnl. D/C telemetry monitoring.    No plans for ischemic workup or stress testing.     Seen by SW. Pt to f/u with Medicine Clinic in [facility] outpatient.     Pt stable for discharge per Attending Dr Rod. 47 yo male with PMH of HTN, Reflux, ?CAD, presents here with chest pain - ruled out cardiac etiology, may be secondary to GERD, no further cardiac work up, for d/c to shelter today    Chest pain: - CP resolved    - does not appear to be cardiac in nature  ; no need for tele, no ischemic w/up indicated    - hstrop 26 --> 28    - no EKG changes    - likely GI source ; c/w maalox and protonix     - TTE reviewed    - Cardiology reccs noted    d/c ASA as unnecessary for primary prevention.     TAHMINA (acute kidney injury): likely prerenal, now resolved    monitor BMP for now    avoid nephrotoxic agents.     HTN (hypertension): BP stable.     HLD (hyperlipidemia): lipid panel wnl.     Acid reflux disease: c/w maalox and protonix.     Alcohol abuse: inconsistent history    c/w ciwa monitoring    alcohol level 19    thaimine/folate/MVI.    Prophylactic measure: low risk for DVT, hold off AC.     Discharge planning issues: Seen by ASHA. d/c to [facility] today. Pt to f/u with Medicine Clinic in [facility] outpatient.     Appointment made with [address] [place] [address] medicine clinic    for November 22nd at 10 am. Appointment obtained from Dorys at Med Clinic on 11/13.     Pt stable for discharge per Attending Dr Rod. 49 yo male with PMH of HTN, Reflux, presents with chest pain, TAHMINA likely pre-renal azotemia and hypophasphatemia. Cardiac etiology ruled out, likely secondary to GERD, no further cardiac work up, for d/c to shelter today    Chest pain: - CP resolved    - does not appear to be cardiac in nature  ; no need for tele, no ischemic w/up indicated    - hstrop 26 --> 28    - no EKG changes    - likely GI source ; c/w maalox and protonix     - TTE reviewed - wnl    d/c ASA as unnecessary for primary prevention.     TAHMIAN (acute kidney injury) and hypophosphatemia likely prerenal, malnutrition and alcohol abuse. Resolved    avoid nephrotoxic agents.     HTN (hypertension): BP stable.     HLD (hyperlipidemia): lipid panel wnl.     Acid reflux disease: c/w maalox and protonix.     Alcohol abuse: inconsistent history    c/w ciwa monitoring    alcohol level 19    thaimine/folate/MVI.    Discharge planning issues: Seen by SW. d/c to [facility] today. Pt to f/u with Medicine Clinic in [facility].     Appointment made with [address] [place] [address] medicine clinic. Rx provided at [facility].    for November 22nd at 10 am. Appointment obtained from Dorys at [facility] on 11/13.

## 2019-11-08 NOTE — DISCHARGE NOTE PROVIDER - PROVIDER TOKENS
FREE:[LAST:[Medicine clinic],PHONE:[phone]],FAX:[(   )    -]] FREE:[LAST:[[facility]],PHONE:[phone]],FAX:[(   )    -],ADDRESS:[Address: [address] [place] [address]  Phone: [phone]  Open · Closes 5PM]]

## 2019-11-08 NOTE — DISCHARGE NOTE PROVIDER - NSFOLLOWUPCLINICS_GEN_ALL_ED_FT
[facility] General Internal Medicine  General Internal Medicine  [address] [id number]  Phone: [phone]  Fax:   Follow Up Time:

## 2019-11-08 NOTE — DISCHARGE NOTE PROVIDER - NSDCFUADDINST_GEN_ALL_CORE_FT
APPOINTMENT MADE FOR YOU AT [facility] ON FRIDAY, NOVEMBER 22 ND AT 10 AM.  PLEASE KEEP APPOINTMENT.

## 2019-11-08 NOTE — DISCHARGE NOTE PROVIDER - CARE PROVIDER_API CALL
Medicine clinic,   Phone: [phone]  Fax: (   )    -  Follow Up Time: [facility],   Address: [address] [place] [address]  Phone: [phone]  Open · Closes 5PM  Phone: [phone]  Fax: (   )    -  Follow Up Time:

## 2019-11-09 PROCEDURE — 99232 SBSQ HOSP IP/OBS MODERATE 35: CPT

## 2019-11-09 RX ADMIN — PANTOPRAZOLE SODIUM 40 MILLIGRAM(S): 20 TABLET, DELAYED RELEASE ORAL at 05:09

## 2019-11-09 RX ADMIN — Medication 81 MILLIGRAM(S): at 12:11

## 2019-11-09 RX ADMIN — Medication 1 TABLET(S): at 12:11

## 2019-11-09 RX ADMIN — Medication 1 MILLIGRAM(S): at 12:11

## 2019-11-09 RX ADMIN — Medication 100 MILLIGRAM(S): at 12:10

## 2019-11-09 NOTE — PROGRESS NOTE ADULT - SUBJECTIVE AND OBJECTIVE BOX
Patient is a 48y old  Male who presents with a chief complaint of chest pain and dizziness (08 Nov 2019 13:10)      SUBJECTIVE / OVERNIGHT EVENTS:    MEDICATIONS  (STANDING):  aspirin enteric coated 81 milliGRAM(s) Oral daily  folic acid 1 milliGRAM(s) Oral daily  multivitamin 1 Tablet(s) Oral daily  pantoprazole    Tablet 40 milliGRAM(s) Oral before breakfast    MEDICATIONS  (PRN):  aluminum hydroxide/magnesium hydroxide/simethicone Suspension 30 milliLiter(s) Oral every 6 hours PRN Dyspepsia  LORazepam     Tablet 2 milliGRAM(s) Oral every 2 hours PRN Symptom-triggered 2 point increase in CIWA-Ar  LORazepam     Tablet 2 milliGRAM(s) Oral every 1 hour PRN Symptom-triggered: each CIWA -Ar score 8 or GREATER      Vital Signs Last 24 Hrs  T(C): 36.8 (09 Nov 2019 12:25), Max: 37.1 (08 Nov 2019 20:53)  T(F): 98.2 (09 Nov 2019 12:25), Max: 98.8 (08 Nov 2019 20:53)  HR: 85 (09 Nov 2019 12:25) (74 - 86)  BP: 134/95 (09 Nov 2019 12:25) (124/80 - 146/89)  BP(mean): --  RR: 17 (09 Nov 2019 12:25) (17 - 18)  SpO2: 100% (09 Nov 2019 12:25) (97% - 100%)  CAPILLARY BLOOD GLUCOSE        I&O's Summary    08 Nov 2019 07:01  -  09 Nov 2019 07:00  --------------------------------------------------------  IN: 360 mL / OUT: 0 mL / NET: 360 mL    09 Nov 2019 07:01  -  09 Nov 2019 13:58  --------------------------------------------------------  IN: 480 mL / OUT: 0 mL / NET: 480 mL        PHYSICAL EXAM:  GENERAL: NAD, well-developed  HEAD:  Atraumatic, Normocephalic  EYES: EOMI, PERRLA, conjunctiva and sclera clear  NECK: Supple, No JVD  CHEST/LUNG: Clear to auscultation bilaterally; No wheeze  HEART: Regular rate and rhythm; No murmurs, rubs, or gallops  ABDOMEN: Soft, Nontender, Nondistended; Bowel sounds present  EXTREMITIES:  2+ Peripheral Pulses, No clubbing, cyanosis, or edema  PSYCH: AAOx3  NEUROLOGY: non-focal  SKIN: No rashes or lesions    LABS:    11-08    135  |  100  |  13  ----------------------------<  99  4.0   |  24  |  1.10    Ca    8.9      08 Nov 2019 07:47  Phos  2.8     11-08  Mg     2.0     11-08    TPro  7.2  /  Alb  4.0  /  TBili  0.5  /  DBili  0.1  /  AST  59<H>  /  ALT  28  /  AlkPhos  86  11-07              RADIOLOGY & ADDITIONAL TESTS:    Imaging Personally Reviewed:    Consultant(s) Notes Reviewed:      Care Discussed with Consultants/Other Providers: Patient is a 48y old  Male who presents with a chief complaint of chest pain and dizziness (08 Nov 2019 13:10)      SUBJECTIVE / OVERNIGHT EVENTS: Pt seen and examined. No acute events overnight. Denies cp, sob.    MEDICATIONS  (STANDING):  aspirin enteric coated 81 milliGRAM(s) Oral daily  folic acid 1 milliGRAM(s) Oral daily  multivitamin 1 Tablet(s) Oral daily  pantoprazole    Tablet 40 milliGRAM(s) Oral before breakfast    MEDICATIONS  (PRN):  aluminum hydroxide/magnesium hydroxide/simethicone Suspension 30 milliLiter(s) Oral every 6 hours PRN Dyspepsia  LORazepam     Tablet 2 milliGRAM(s) Oral every 2 hours PRN Symptom-triggered 2 point increase in CIWA-Ar  LORazepam     Tablet 2 milliGRAM(s) Oral every 1 hour PRN Symptom-triggered: each CIWA -Ar score 8 or GREATER      Vital Signs Last 24 Hrs  T(C): 36.8 (09 Nov 2019 12:25), Max: 37.1 (08 Nov 2019 20:53)  T(F): 98.2 (09 Nov 2019 12:25), Max: 98.8 (08 Nov 2019 20:53)  HR: 85 (09 Nov 2019 12:25) (74 - 86)  BP: 134/95 (09 Nov 2019 12:25) (124/80 - 146/89)  BP(mean): --  RR: 17 (09 Nov 2019 12:25) (17 - 18)  SpO2: 100% (09 Nov 2019 12:25) (97% - 100%)  CAPILLARY BLOOD GLUCOSE        I&O's Summary    08 Nov 2019 07:01  -  09 Nov 2019 07:00  --------------------------------------------------------  IN: 360 mL / OUT: 0 mL / NET: 360 mL    09 Nov 2019 07:01  -  09 Nov 2019 13:58  --------------------------------------------------------  IN: 480 mL / OUT: 0 mL / NET: 480 mL        PHYSICAL EXAM:  GENERAL: NAD, anicteric, afebrile  HEAD:  Atraumatic, Normocephalic  EYES: EOMI, PERRLA, conjunctiva and sclera clear  NECK: Supple, No JVD  CHEST/LUNG: Clear to auscultation bilaterally; No wheeze  HEART: Regular rate and rhythm; No murmurs, rubs, or gallops  ABDOMEN: Soft, Nontender, Nondistended; Bowel sounds present  EXTREMITIES:  2+ Peripheral Pulses, No clubbing, cyanosis, or edema  PSYCH: AAOx3  NEUROLOGY: non-focal, no tremors noted  SKIN: No rashes or lesions    LABS:    11-08    135  |  100  |  13  ----------------------------<  99  4.0   |  24  |  1.10    Ca    8.9      08 Nov 2019 07:47  Phos  2.8     11-08  Mg     2.0     11-08    TPro  7.2  /  Alb  4.0  /  TBili  0.5  /  DBili  0.1  /  AST  59<H>  /  ALT  28  /  AlkPhos  86  11-07

## 2019-11-10 PROCEDURE — 99232 SBSQ HOSP IP/OBS MODERATE 35: CPT

## 2019-11-10 RX ORDER — ACETAMINOPHEN 500 MG
650 TABLET ORAL ONCE
Refills: 0 | Status: COMPLETED | OUTPATIENT
Start: 2019-11-10 | End: 2019-11-10

## 2019-11-10 RX ADMIN — Medication 1 TABLET(S): at 13:16

## 2019-11-10 RX ADMIN — PANTOPRAZOLE SODIUM 40 MILLIGRAM(S): 20 TABLET, DELAYED RELEASE ORAL at 05:32

## 2019-11-10 RX ADMIN — Medication 650 MILLIGRAM(S): at 14:45

## 2019-11-10 RX ADMIN — Medication 2 MILLIGRAM(S): at 13:16

## 2019-11-10 RX ADMIN — Medication 650 MILLIGRAM(S): at 13:55

## 2019-11-10 RX ADMIN — Medication 81 MILLIGRAM(S): at 13:15

## 2019-11-10 RX ADMIN — Medication 1 MILLIGRAM(S): at 13:16

## 2019-11-10 NOTE — PROGRESS NOTE ADULT - SUBJECTIVE AND OBJECTIVE BOX
Consult F.U. Note:  · Requested by Name:	ANN MARIE Salgado	  · Date/Time:	10-Nov-2019 	  · Reason for Referral/Consultation:	Dizziness	  · Reason for Admission	chest pain and dizziness	      · Subjective and Objective: 	    MRN-[id number]    CHIEF COMPLAINT:  Patient is a 48y old  Male who presents with a chief complaint of chest pain and dizziness (07 Nov 2019 04:24)      HISTORY OF PRESENT ILLNESS:  48 M w/ no known PMH initially presented to OSH with complaints of dizziness. EKG there revealed ROXANN in I and AVL w/ troponin I elevation of .08 and patient transferred for further eval. He reports chest pain burning in nature that upon further elucidation was localized to his epigastrium. Denies any palpitations or SOB. Drinks half a pint of vodka frequently although pt unable to further quantify. Lives in a shelter and does not obtain routine medical care    ===========================  Interval Events  - On CIWA   - No reported worsening CP/Palps/SOB\  ===========================      Allergies    No Known Allergies    PAST MEDICAL & SURGICAL HISTORY:  Acid reflux disease  HLD (hyperlipidemia)  HTN (hypertension)  H/O coronary angiogram      FAMILY HISTORY:  FH: heart disease: father @age 70      SOCIAL HISTORY:    [ ] Non-smoker  [ ] Smoker  [ ] Alcohol    REVIEW OF SYSTEMS:  CONSTITUTIONAL: No fever, weight loss, or fatigue  EYES: No eye pain, visual disturbances, or discharge  ENMT:  No difficulty hearing, tinnitus, vertigo; No sinus or throat pain  NECK: No pain or stiffness  RESPIRATORY: No cough, wheezing, chills or hemoptysis; No Shortness of Breath  CARDIOVASCULAR: No chest pain, palpitations, passing out, dizziness, or leg swelling  GASTROINTESTINAL: No abdominal or epigastric pain. No nausea, vomiting, or hematemesis; No diarrhea or constipation. No melena or hematochezia.  GENITOURINARY: No dysuria, frequency, hematuria, or incontinence  NEUROLOGICAL: No headaches, memory loss, loss of strength, numbness, or tremors  SKIN: No itching, burning, rashes, or lesions   LYMPH Nodes: No enlarged glands  ENDOCRINE: No heat or cold intolerance; No hair loss  MUSCULOSKELETAL: No joint pain or swelling; No muscle, back, or extremity pain  PSYCHIATRIC: No depression, anxiety, mood swings, or difficulty sleeping  HEME/LYMPH: No easy bruising, or bleeding gums  ALLERY AND IMMUNOLOGIC: No hives or eczema	    [ ] All others negative	  [ ] Unable to obtain    I&O's Summary      PHYSICAL EXAM:  Vital Signs Last 24 Hrs  T(C): 36.7 (07 Nov 2019 15:19), Max: 37.2 (07 Nov 2019 11:39)  T(F): 98.1 (07 Nov 2019 15:19), Max: 99 (07 Nov 2019 11:39)  HR: 102 (07 Nov 2019 15:19) (83 - 102)  BP: 135/87 (07 Nov 2019 15:19) (113/87 - 146/98)  BP(mean): --  RR: 18 (07 Nov 2019 15:19) (16 - 18)  SpO2: 98% (07 Nov 2019 15:19) (96% - 100%)  Appearance: Normal	  HEENT:   Normal oral mucosa, PERRL, EOMI	  Lymphatic: No lymphadenopathy  Cardiovascular: Normal S1 S2, No JVD, No murmurs, No edema  Respiratory: Lungs clear to auscultation	  Psychiatry: A & O x 3, Mood & affect appropriate  Gastrointestinal:  Soft, Non-tender, + BS	  Skin: No rashes, No ecchymoses, No cyanosis	  Neurologic: Non-focal  Extremities: Normal range of motion, No clubbing, cyanosis or edema  Vascular: Peripheral pulses palpable 2+ bilaterally    MEDICATIONS:  MEDICATIONS  (STANDING):  aspirin enteric coated 81 milliGRAM(s) Oral daily  folic acid 1 milliGRAM(s) Oral daily  multivitamin 1 Tablet(s) Oral daily  pantoprazole    Tablet 40 milliGRAM(s) Oral before breakfast  thiamine 100 milliGRAM(s) Oral daily    MEDICATIONS  (PRN):  aluminum hydroxide/magnesium hydroxide/simethicone Suspension 30 milliLiter(s) Oral every 6 hours PRN Dyspepsia  LORazepam     Tablet 2 milliGRAM(s) Oral every 2 hours PRN Symptom-triggered 2 point increase in CIWA-Ar  LORazepam     Tablet 2 milliGRAM(s) Oral every 1 hour PRN Symptom-triggered: each CIWA -Ar score 8 or GREATER      Home Medications:      LABS:	 	Labs Personally Reviewed 11/10/2019    CBC Full  -  ( 06 Nov 2019 23:52 )  WBC Count : 5.32 K/uL  Hemoglobin : 11.5 g/dL  Hematocrit : 32.6 %  Platelet Count - Automated : 232 K/uL  Mean Cell Volume : 95.3 fl  Mean Cell Hemoglobin : 33.6 pg  Mean Cell Hemoglobin Concentration : 35.3 gm/dL  Auto Neutrophil # : x  Auto Lymphocyte # : x  Auto Monocyte # : x  Auto Eosinophil # : x  Auto Basophil # : x  Auto Neutrophil % : x  Auto Lymphocyte % : x  Auto Monocyte % : x  Auto Eosinophil % : x  Auto Basophil % : x    11-07    137  |  101  |  10  ----------------------------<  106<H>  4.3   |  25  |  1.24  11-07    140  |  100  |  9   ----------------------------<  91  3.3<L>   |  25  |  1.13    Ca    8.8      07 Nov 2019 14:40  Ca    8.9      07 Nov 2019 05:28  Phos  2.3     11-07  Phos  3.3     11-07  Mg     2.1     11-07  Mg     2.1     11-07    TPro  7.2  /  Alb  4.0  /  TBili  0.5  /  DBili  0.1  /  AST  59<H>  /  ALT  28  /  AlkPhos  86  11-07  TPro  7.2  /  Alb  3.8  /  TBili  0.4  /  DBili  x   /  AST  37  /  ALT  22  /  AlkPhos  79  11-06    PT/INR - ( 06 Nov 2019 23:52 )   PT: 11.8 sec;   INR: 1.03 ratio         PTT - ( 06 Nov 2019 23:52 )  PTT:30.4 sec        proBNP:   Lipid Profile:   HgA1c:   TSH:     TELEMETRY: 	    ECG:  	  RADIOLOGY:  OTHER: 	    CARDIAC TESTING/STUDIES:    [ ] Echocardiogram:  [ ]  Catheterization:  [ ] Stress Test:  	  	      Recs  - Normal TTE  - No troponin elevation  - If recurrent CP, please check EKG/Enzymes and reconsult prn

## 2019-11-10 NOTE — PROGRESS NOTE ADULT - SUBJECTIVE AND OBJECTIVE BOX
Patient is a 48y old  Male who presents with a chief complaint of chest pain and dizziness (09 Nov 2019 14:51)      SUBJECTIVE / OVERNIGHT EVENTS: Pt seen and examined. No acute events overnight. Denies cp, sob.      MEDICATIONS  (STANDING):  aspirin enteric coated 81 milliGRAM(s) Oral daily  folic acid 1 milliGRAM(s) Oral daily  multivitamin 1 Tablet(s) Oral daily  pantoprazole    Tablet 40 milliGRAM(s) Oral before breakfast    MEDICATIONS  (PRN):  aluminum hydroxide/magnesium hydroxide/simethicone Suspension 30 milliLiter(s) Oral every 6 hours PRN Dyspepsia  LORazepam     Tablet 2 milliGRAM(s) Oral every 2 hours PRN Symptom-triggered 2 point increase in CIWA-Ar  LORazepam     Tablet 2 milliGRAM(s) Oral every 1 hour PRN Symptom-triggered: each CIWA -Ar score 8 or GREATER      Vital Signs Last 24 Hrs  T(C): 37.1 (10 Nov 2019 16:10), Max: 37.5 (10 Nov 2019 12:03)  T(F): 98.7 (10 Nov 2019 16:10), Max: 99.5 (10 Nov 2019 12:03)  HR: 88 (10 Nov 2019 16:10) (78 - 91)  BP: 131/92 (10 Nov 2019 16:10) (123/80 - 150/96)  BP(mean): --  RR: 18 (10 Nov 2019 16:10) (17 - 18)  SpO2: 100% (10 Nov 2019 16:10) (98% - 100%)  CAPILLARY BLOOD GLUCOSE        I&O's Summary    09 Nov 2019 07:01  -  10 Nov 2019 07:00  --------------------------------------------------------  IN: 1160 mL / OUT: 0 mL / NET: 1160 mL    10 Nov 2019 07:01  -  10 Nov 2019 17:51  --------------------------------------------------------  IN: 800 mL / OUT: 0 mL / NET: 800 mL        PHYSICAL EXAM:  GENERAL: NAD, anicteric, afebrile  HEAD:  Atraumatic, Normocephalic  EYES: EOMI, PERRLA, conjunctiva and sclera clear  NECK: Supple, No JVD  CHEST/LUNG: Clear to auscultation bilaterally; No wheeze  HEART: Regular rate and rhythm; No murmurs, rubs, or gallops  ABDOMEN: Soft, Nontender, Nondistended; Bowel sounds present  EXTREMITIES:  2+ Peripheral Pulses, No clubbing, cyanosis, or edema  PSYCH: AAOx3  NEUROLOGY: non-focal, no tremors noted  SKIN: No rashes or lesions    LABS: reviewed                    RADIOLOGY & ADDITIONAL TESTS:    Imaging Personally Reviewed:    Consultant(s) Notes Reviewed:      Care Discussed with Consultants/Other Providers:

## 2019-11-11 PROCEDURE — 99232 SBSQ HOSP IP/OBS MODERATE 35: CPT

## 2019-11-11 RX ADMIN — PANTOPRAZOLE SODIUM 40 MILLIGRAM(S): 20 TABLET, DELAYED RELEASE ORAL at 06:01

## 2019-11-11 RX ADMIN — Medication 81 MILLIGRAM(S): at 12:25

## 2019-11-11 NOTE — PROGRESS NOTE ADULT - SUBJECTIVE AND OBJECTIVE BOX
Consult F.U. Note:  · Requested by Name:	ANN MARIE Salgado	  · Date/Time:	11-Nov-2019 	  · Reason for Referral/Consultation:	Dizziness	  · Reason for Admission	chest pain and dizziness	      · Subjective and Objective: 	    MRN-[id number]    CHIEF COMPLAINT:  Patient is a 48y old  Male who presents with a chief complaint of chest pain and dizziness (07 Nov 2019 04:24)      HISTORY OF PRESENT ILLNESS:  48 M w/ no known PMH initially presented to OSH with complaints of dizziness. EKG there revealed ROXANN in I and AVL w/ troponin I elevation of .08 and patient transferred for further eval. He reports chest pain burning in nature that upon further elucidation was localized to his epigastrium. Denies any palpitations or SOB. Drinks half a pint of vodka frequently although pt unable to further quantify. Lives in a shelter and does not obtain routine medical care    ===========================  Interval Events  - On CIWA   - No reported worsening CP/Palps/SOB\  ===========================      Allergies    No Known Allergies    PAST MEDICAL & SURGICAL HISTORY:  Acid reflux disease  HLD (hyperlipidemia)  HTN (hypertension)  H/O coronary angiogram      FAMILY HISTORY:  FH: heart disease: father @age 70      SOCIAL HISTORY:    [ ] Non-smoker  [ ] Smoker  [ ] Alcohol    REVIEW OF SYSTEMS:  CONSTITUTIONAL: No fever, weight loss, or fatigue  EYES: No eye pain, visual disturbances, or discharge  ENMT:  No difficulty hearing, tinnitus, vertigo; No sinus or throat pain  NECK: No pain or stiffness  RESPIRATORY: No cough, wheezing, chills or hemoptysis; No Shortness of Breath  CARDIOVASCULAR: No chest pain, palpitations, passing out, dizziness, or leg swelling  GASTROINTESTINAL: No abdominal or epigastric pain. No nausea, vomiting, or hematemesis; No diarrhea or constipation. No melena or hematochezia.  GENITOURINARY: No dysuria, frequency, hematuria, or incontinence  NEUROLOGICAL: No headaches, memory loss, loss of strength, numbness, or tremors  SKIN: No itching, burning, rashes, or lesions   LYMPH Nodes: No enlarged glands  ENDOCRINE: No heat or cold intolerance; No hair loss  MUSCULOSKELETAL: No joint pain or swelling; No muscle, back, or extremity pain  PSYCHIATRIC: No depression, anxiety, mood swings, or difficulty sleeping  HEME/LYMPH: No easy bruising, or bleeding gums  ALLERY AND IMMUNOLOGIC: No hives or eczema	    [ ] All others negative	  [ ] Unable to obtain    I&O's Summary      PHYSICAL EXAM:  Vital Signs Last 24 Hrs  T(C): 36.7 (07 Nov 2019 15:19), Max: 37.2 (07 Nov 2019 11:39)  T(F): 98.1 (07 Nov 2019 15:19), Max: 99 (07 Nov 2019 11:39)  HR: 102 (07 Nov 2019 15:19) (83 - 102)  BP: 135/87 (07 Nov 2019 15:19) (113/87 - 146/98)  BP(mean): --  RR: 18 (07 Nov 2019 15:19) (16 - 18)  SpO2: 98% (07 Nov 2019 15:19) (96% - 100%)  Appearance: Normal	  HEENT:   Normal oral mucosa, PERRL, EOMI	  Lymphatic: No lymphadenopathy  Cardiovascular: Normal S1 S2, No JVD, No murmurs, No edema  Respiratory: Lungs clear to auscultation	  Psychiatry: A & O x 3, Mood & affect appropriate  Gastrointestinal:  Soft, Non-tender, + BS	  Skin: No rashes, No ecchymoses, No cyanosis	  Neurologic: Non-focal  Extremities: Normal range of motion, No clubbing, cyanosis or edema  Vascular: Peripheral pulses palpable 2+ bilaterally    MEDICATIONS:  MEDICATIONS  (STANDING):  aspirin enteric coated 81 milliGRAM(s) Oral daily  folic acid 1 milliGRAM(s) Oral daily  multivitamin 1 Tablet(s) Oral daily  pantoprazole    Tablet 40 milliGRAM(s) Oral before breakfast  thiamine 100 milliGRAM(s) Oral daily    MEDICATIONS  (PRN):  aluminum hydroxide/magnesium hydroxide/simethicone Suspension 30 milliLiter(s) Oral every 6 hours PRN Dyspepsia  LORazepam     Tablet 2 milliGRAM(s) Oral every 2 hours PRN Symptom-triggered 2 point increase in CIWA-Ar  LORazepam     Tablet 2 milliGRAM(s) Oral every 1 hour PRN Symptom-triggered: each CIWA -Ar score 8 or GREATER      Home Medications:      LABS:	 	Labs Personally Reviewed 11/10/2019    CBC Full  -  ( 06 Nov 2019 23:52 )  WBC Count : 5.32 K/uL  Hemoglobin : 11.5 g/dL  Hematocrit : 32.6 %  Platelet Count - Automated : 232 K/uL  Mean Cell Volume : 95.3 fl  Mean Cell Hemoglobin : 33.6 pg  Mean Cell Hemoglobin Concentration : 35.3 gm/dL  Auto Neutrophil # : x  Auto Lymphocyte # : x  Auto Monocyte # : x  Auto Eosinophil # : x  Auto Basophil # : x  Auto Neutrophil % : x  Auto Lymphocyte % : x  Auto Monocyte % : x  Auto Eosinophil % : x  Auto Basophil % : x    11-07    137  |  101  |  10  ----------------------------<  106<H>  4.3   |  25  |  1.24  11-07    140  |  100  |  9   ----------------------------<  91  3.3<L>   |  25  |  1.13    Ca    8.8      07 Nov 2019 14:40  Ca    8.9      07 Nov 2019 05:28  Phos  2.3     11-07  Phos  3.3     11-07  Mg     2.1     11-07  Mg     2.1     11-07    TPro  7.2  /  Alb  4.0  /  TBili  0.5  /  DBili  0.1  /  AST  59<H>  /  ALT  28  /  AlkPhos  86  11-07  TPro  7.2  /  Alb  3.8  /  TBili  0.4  /  DBili  x   /  AST  37  /  ALT  22  /  AlkPhos  79  11-06    PT/INR - ( 06 Nov 2019 23:52 )   PT: 11.8 sec;   INR: 1.03 ratio         PTT - ( 06 Nov 2019 23:52 )  PTT:30.4 sec        proBNP:   Lipid Profile:   HgA1c:   TSH:     TELEMETRY: 	    ECG:  	  RADIOLOGY:  OTHER: 	    CARDIAC TESTING/STUDIES:    [ ] Echocardiogram:  [ ]  Catheterization:  [ ] Stress Test:  	  	      Recs  - Normal TTE  - No troponin elevation  - Agree with plan as outlined above.; think asa poses more risk; not necessary to continue  - Will sign off  - If recurrent CP, please check EKG/Enzymes and reconsult prn

## 2019-11-11 NOTE — PROGRESS NOTE ADULT - SUBJECTIVE AND OBJECTIVE BOX
PROGRESS NOTE:   Authoted by Dr. Lucia Rod MD  Pager [phone]     Patient is a 48y old  Male who presents with a chief complaint of chest pain and dizziness (10 Nov 2019 20:57)      SUBJECTIVE / OVERNIGHT EVENTS: Patient seen and examined at bedside - patient says he has had intermittent chest pain, although none since coming to the hospital. Patient is homeless, awaiting social work for discharge plan    ADDITIONAL REVIEW OF SYSTEMS: as above    MEDICATIONS  (STANDING):  aspirin enteric coated 81 milliGRAM(s) Oral daily  pantoprazole    Tablet 40 milliGRAM(s) Oral before breakfast    MEDICATIONS  (PRN):  aluminum hydroxide/magnesium hydroxide/simethicone Suspension 30 milliLiter(s) Oral every 6 hours PRN Dyspepsia      CAPILLARY BLOOD GLUCOSE        I&O's Summary    10 Nov 2019 07:01  -  11 Nov 2019 07:00  --------------------------------------------------------  IN: 1280 mL / OUT: 0 mL / NET: 1280 mL        PHYSICAL EXAM:  Vital Signs Last 24 Hrs  T(C): 36.8 (11 Nov 2019 09:22), Max: 37.5 (10 Nov 2019 12:03)  T(F): 98.3 (11 Nov 2019 09:22), Max: 99.5 (10 Nov 2019 12:03)  HR: 84 (11 Nov 2019 09:22) (80 - 88)  BP: 129/89 (11 Nov 2019 09:22) (122/80 - 150/96)  BP(mean): --  RR: 18 (11 Nov 2019 09:22) (18 - 18)  SpO2: 98% (11 Nov 2019 09:22) (97% - 100%)    CONSTITUTIONAL: NAD, well-developed  RESPIRATORY: Normal respiratory effort; lungs are clear to auscultation bilaterally  CARDIOVASCULAR: Regular rate and rhythm, normal S1 and S2, no murmur/rub/gallop; No lower extremity edema; Peripheral pulses are 2+ bilaterally  ABDOMEN: Nontender to palpation, normoactive bowel sounds, no rebound/guarding; No hepatosplenomegaly  MUSCLOSKELETAL: no clubbing or cyanosis of digits; no joint swelling or tenderness to palpation  PSYCH: A+O to person, place, and time; affect appropriate    COORDINATION OF CARE:  Care Discussed with Consultants/Other Providers [Y/N]:  Prior or Outpatient Records Reviewed [Y/N]:

## 2019-11-12 PROCEDURE — 99232 SBSQ HOSP IP/OBS MODERATE 35: CPT

## 2019-11-12 RX ORDER — PANTOPRAZOLE SODIUM 20 MG/1
1 TABLET, DELAYED RELEASE ORAL
Qty: 30 | Refills: 0
Start: 2019-11-12 | End: 2019-12-11

## 2019-11-12 RX ADMIN — PANTOPRAZOLE SODIUM 40 MILLIGRAM(S): 20 TABLET, DELAYED RELEASE ORAL at 06:10

## 2019-11-12 NOTE — PROGRESS NOTE ADULT - SUBJECTIVE AND OBJECTIVE BOX
PROGRESS NOTE:   Authoted by Dr. Lucia Rod MD  Pager [phone]     Patient is a 48y old  Male who presents with a chief complaint of chest pain and dizziness (11 Nov 2019 21:43)      SUBJECTIVE / OVERNIGHT EVENTS: Patient seen and examined at bedside - patient doing well. denies cp. Awaiting discharge to shelter    ADDITIONAL REVIEW OF SYSTEMS: as above    MEDICATIONS  (STANDING):  aspirin enteric coated 81 milliGRAM(s) Oral daily  pantoprazole    Tablet 40 milliGRAM(s) Oral before breakfast    MEDICATIONS  (PRN):  aluminum hydroxide/magnesium hydroxide/simethicone Suspension 30 milliLiter(s) Oral every 6 hours PRN Dyspepsia      CAPILLARY BLOOD GLUCOSE        I&O's Summary    11 Nov 2019 07:01  -  12 Nov 2019 07:00  --------------------------------------------------------  IN: 610 mL / OUT: 0 mL / NET: 610 mL        PHYSICAL EXAM:  Vital Signs Last 24 Hrs  T(C): 36.9 (12 Nov 2019 06:08), Max: 36.9 (11 Nov 2019 20:38)  T(F): 98.4 (12 Nov 2019 06:08), Max: 98.5 (11 Nov 2019 20:38)  HR: 75 (12 Nov 2019 06:08) (75 - 82)  BP: 145/90 (12 Nov 2019 06:08) (125/85 - 145/90)  BP(mean): --  RR: 18 (12 Nov 2019 06:08) (18 - 18)  SpO2: 99% (12 Nov 2019 06:08) (98% - 99%)    CONSTITUTIONAL: NAD, well-developed  RESPIRATORY: Normal respiratory effort; lungs are clear to auscultation bilaterally  CARDIOVASCULAR: Regular rate and rhythm, normal S1 and S2, no murmur/rub/gallop; No lower extremity edema; Peripheral pulses are 2+ bilaterally  ABDOMEN: Nontender to palpation, normoactive bowel sounds, no rebound/guarding; No hepatosplenomegaly  MUSCLOSKELETAL: no clubbing or cyanosis of digits; no joint swelling or tenderness to palpation  PSYCH: A+O to person, place, and time; affect appropriate

## 2019-11-13 ENCOUNTER — TRANSCRIPTION ENCOUNTER (OUTPATIENT)
Age: 49
End: 2019-11-13

## 2019-11-13 VITALS
HEART RATE: 81 BPM | OXYGEN SATURATION: 98 % | SYSTOLIC BLOOD PRESSURE: 112 MMHG | TEMPERATURE: 99 F | DIASTOLIC BLOOD PRESSURE: 77 MMHG | RESPIRATION RATE: 18 BRPM

## 2019-11-13 PROBLEM — I10 ESSENTIAL (PRIMARY) HYPERTENSION: Chronic | Status: ACTIVE | Noted: 2019-11-07

## 2019-11-13 PROBLEM — E78.5 HYPERLIPIDEMIA, UNSPECIFIED: Chronic | Status: ACTIVE | Noted: 2019-11-07

## 2019-11-13 PROBLEM — K21.9 GASTRO-ESOPHAGEAL REFLUX DISEASE WITHOUT ESOPHAGITIS: Chronic | Status: ACTIVE | Noted: 2019-11-07

## 2019-11-13 PROCEDURE — 80307 DRUG TEST PRSMV CHEM ANLYZR: CPT

## 2019-11-13 PROCEDURE — 83880 ASSAY OF NATRIURETIC PEPTIDE: CPT

## 2019-11-13 PROCEDURE — 99239 HOSP IP/OBS DSCHRG MGMT >30: CPT

## 2019-11-13 PROCEDURE — 85027 COMPLETE CBC AUTOMATED: CPT

## 2019-11-13 PROCEDURE — 85610 PROTHROMBIN TIME: CPT

## 2019-11-13 PROCEDURE — 85730 THROMBOPLASTIN TIME PARTIAL: CPT

## 2019-11-13 PROCEDURE — 71045 X-RAY EXAM CHEST 1 VIEW: CPT

## 2019-11-13 PROCEDURE — 84100 ASSAY OF PHOSPHORUS: CPT

## 2019-11-13 PROCEDURE — 83690 ASSAY OF LIPASE: CPT

## 2019-11-13 PROCEDURE — 99285 EMERGENCY DEPT VISIT HI MDM: CPT | Mod: 25

## 2019-11-13 PROCEDURE — 84484 ASSAY OF TROPONIN QUANT: CPT

## 2019-11-13 PROCEDURE — 96374 THER/PROPH/DIAG INJ IV PUSH: CPT

## 2019-11-13 PROCEDURE — 83735 ASSAY OF MAGNESIUM: CPT

## 2019-11-13 PROCEDURE — 80048 BASIC METABOLIC PNL TOTAL CA: CPT

## 2019-11-13 PROCEDURE — 80053 COMPREHEN METABOLIC PANEL: CPT

## 2019-11-13 PROCEDURE — 84443 ASSAY THYROID STIM HORMONE: CPT

## 2019-11-13 PROCEDURE — 93005 ELECTROCARDIOGRAM TRACING: CPT

## 2019-11-13 PROCEDURE — 80076 HEPATIC FUNCTION PANEL: CPT

## 2019-11-13 PROCEDURE — 80061 LIPID PANEL: CPT

## 2019-11-13 PROCEDURE — 93306 TTE W/DOPPLER COMPLETE: CPT

## 2019-11-13 RX ADMIN — PANTOPRAZOLE SODIUM 40 MILLIGRAM(S): 20 TABLET, DELAYED RELEASE ORAL at 06:23

## 2019-11-13 NOTE — PROGRESS NOTE ADULT - PROBLEM SELECTOR PLAN 5
c/w maalox and protonix

## 2019-11-13 NOTE — PROGRESS NOTE ADULT - PROBLEM SELECTOR PLAN 8
1.  Name of PCP: No PCP  2.  PCP Contacted on Admission: [ ] Y    [x ] N    3.  PCP contacted at Discharge: [ ] Y    [ ] N    [ ] N/A  4.  Post-Discharge Appointment Date and Location:  5.  Summary of Handoff given to PCP:    ASHA to facilitate with discharge planning as pt is undomiciled.
1.  Name of PCP: No PCP - SW to facilitate an appointment at medicine clinic [id number]  SW to facilitate with discharge planning to shelter
1.  Name of PCP: No PCP - will need to be given appointment at medicine [facility]  2.  PCP Contacted on Admission: [ ] Y    [x ] N    3.  PCP contacted at Discharge: [ ] Y    [ ] N    [ ] N/A  4.  Post-Discharge Appointment Date and Location:  5.  Summary of Handoff given to PCP:    SW to facilitate with discharge planning as pt is undomiciled.
d/c to shelter today. Appointment made with [address] [facility]
1.  Name of PCP: No PCP  2.  PCP Contacted on Admission: [ ] Y    [x ] N    3.  PCP contacted at Discharge: [ ] Y    [ ] N    [ ] N/A  4.  Post-Discharge Appointment Date and Location:  5.  Summary of Handoff given to PCP:    ASHA to facilitate with discharge planning as pt is undomiciled.
1.  Name of PCP: No PCP  2.  PCP Contacted on Admission: [ ] Y    [x ] N    3.  PCP contacted at Discharge: [ ] Y    [ ] N    [ ] N/A  4.  Post-Discharge Appointment Date and Location:  5.  Summary of Handoff given to PCP:    ASHA to facilitate with discharge planning as pt is undomiciled.

## 2019-11-13 NOTE — PROGRESS NOTE ADULT - PROBLEM SELECTOR PLAN 7
low risk for DVT, hold off AC

## 2019-11-13 NOTE — PROGRESS NOTE ADULT - REASON FOR ADMISSION
chest pain and dizziness

## 2019-11-13 NOTE — PROGRESS NOTE ADULT - PROBLEM SELECTOR PLAN 1
This patient has an active prescription at her pharmacy. I wrote it 5/18/2017 for a 1 month supply and 11 refills.
- CP now resolved  - does not appear to be cardiac in nature  ; no need for tele, no ischemic w/up indicated  - hstrop 26 --> 28  - no EKG changes  - likely GI source ; c/w maalox and protonix   - TTE reviewed  - Cardiology reccs noted
- CP resolved  - does not appear to be cardiac in nature  ; no need for tele, no ischemic w/up indicated  - hstrop 26 --> 28  - no EKG changes  - likely GI source ; c/w maalox and protonix   - TTE reviewed  - Cardiology reccs noted  Unclear if need to be on ASA daily since not necessary for primary prevention.
- CP resolved  - does not appear to be cardiac in nature  ; no need for tele, no ischemic w/up indicated  - hstrop 26 --> 28  - no EKG changes  - likely GI source ; c/w maalox and protonix   - TTE reviewed  - Cardiology reccs noted  d/c ASA as unnecessary for primary prevention
- CP resolved  - does not appear to be cardiac in nature  ; no need for tele, no ischemic w/up indicated  - hstrop 26 --> 28  - no EKG changes  - likely GI source ; c/w maalox and protonix   - TTE reviewed  - Cardiology reccs noted  d/c ASA as unnecessary for primary prevention
- CP now resolved  - does not appear to be cardiac in nature  ; no need for tele, no ischemic w/up indicated  - hstrop 26 --> 28  - no EKG changes  - likely GI source ; c/w maalox and protonix   - TTE reviewed  - Cardiology reccs noted
- CP now resolved  - does not appear to be cardiac in nature  ; no need for tele, no ischemic w/up indicated  - hstrop 26 --> 28  - no EKG changes  - likely GI source ; c/w maalox and protonix   - TTE reviewed  - Cardiology reccs noted

## 2019-11-13 NOTE — PROGRESS NOTE ADULT - PROBLEM SELECTOR PLAN 6
inconsistent history  c/w ciwa monitoring  alcohol level 19  thaimine/folate/MVI

## 2019-11-13 NOTE — PROGRESS NOTE ADULT - PROBLEM SELECTOR PLAN 2
likely prerenal, now resolved  monitor BMP for now  avoid nephrotoxic agents
unknown baseline  monitor BMP for now  avoid nephrotoxic agents

## 2019-11-13 NOTE — PROGRESS NOTE ADULT - ASSESSMENT
47 yo male with PMH of HTN, Reflux, ?CAD, ?CHF, presents here with chest pain.
47 yo male with PMH of HTN, Reflux, ?CAD, presents here with chest pain - ruled out cardiac etiology, may be secondary to GERD, no further cardiac work up, for d/c once safe plan obtained by ASHA.
47 yo male with PMH of HTN, Reflux, ?CAD, presents here with chest pain - ruled out cardiac etiology, may be secondary to GERD, no further cardiac work up, for d/c once safe plan obtained by ASHA.
49 yo male with PMH of HTN, Reflux, ?CAD, presents here with chest pain - ruled out cardiac etiology, may be secondary to GERD, no further cardiac work up, for d/c to shelter today
47 yo male with PMH of HTN, Reflux, ?CAD, ?CHF, presents here with chest pain.
49 yo male with PMH of HTN, Reflux, ?CAD, ?CHF, presents here with chest pain.

## 2019-11-13 NOTE — PROGRESS NOTE ADULT - PROBLEM SELECTOR PROBLEM 5
Acid reflux disease

## 2019-11-13 NOTE — DISCHARGE NOTE NURSING/CASE MANAGEMENT/SOCIAL WORK - PATIENT PORTAL LINK FT
You can access the FollowMyHealth Patient Portal offered by [facility] by registering at the following website: [email or web address]. By joining [facility]’s FollowMyHealth portal, you will also be able to view your health information using other applications (apps) compatible with our system.

## 2019-11-13 NOTE — PROGRESS NOTE ADULT - PROBLEM SELECTOR PROBLEM 2
TAHMINA (acute kidney injury)

## 2019-11-13 NOTE — PROGRESS NOTE ADULT - SUBJECTIVE AND OBJECTIVE BOX
PROGRESS NOTE:   Authoted by Dr. Lucia Rod MD  Pager [phone]     Patient is a 48y old  Male who presents with a chief complaint of chest pain and dizziness (12 Nov 2019 11:33)      SUBJECTIVE / OVERNIGHT EVENTS: Patient seen and examined at bedside - patient feeling well. Ready to go to the shelter. No chest pain    ADDITIONAL REVIEW OF SYSTEMS: as above    MEDICATIONS  (STANDING):  pantoprazole    Tablet 40 milliGRAM(s) Oral before breakfast    MEDICATIONS  (PRN):  aluminum hydroxide/magnesium hydroxide/simethicone Suspension 30 milliLiter(s) Oral every 6 hours PRN Dyspepsia      CAPILLARY BLOOD GLUCOSE        I&O's Summary    12 Nov 2019 07:01  -  13 Nov 2019 07:00  --------------------------------------------------------  IN: 370 mL / OUT: 0 mL / NET: 370 mL    13 Nov 2019 07:01  -  13 Nov 2019 12:16  --------------------------------------------------------  IN: 360 mL / OUT: 0 mL / NET: 360 mL        PHYSICAL EXAM:  Vital Signs Last 24 Hrs  T(C): 37 (13 Nov 2019 06:22), Max: 37 (13 Nov 2019 06:22)  T(F): 98.6 (13 Nov 2019 06:22), Max: 98.6 (13 Nov 2019 06:22)  HR: 75 (13 Nov 2019 06:22) (75 - 86)  BP: 139/83 (13 Nov 2019 06:22) (127/81 - 145/92)  BP(mean): --  RR: 18 (13 Nov 2019 06:22) (16 - 18)  SpO2: 96% (13 Nov 2019 06:22) (95% - 96%)    CONSTITUTIONAL: NAD, well-developed  RESPIRATORY: Normal respiratory effort; lungs are clear to auscultation bilaterally  CARDIOVASCULAR: Regular rate and rhythm, normal S1 and S2, no murmur/rub/gallop; No lower extremity edema; Peripheral pulses are 2+ bilaterally  ABDOMEN: Nontender to palpation, normoactive bowel sounds, no rebound/guarding; No hepatosplenomegaly  MUSCLOSKELETAL: no clubbing or cyanosis of digits; no joint swelling or tenderness to palpation  PSYCH: A+O to person, place, and time; affect appropriate

## 2019-11-19 NOTE — CDI QUERY NOTE - NSCDIOTHERTXTBX_GEN_ALL_CORE_HH
Admitted with CP likely due to GERD as documented. Also documented as pt had TAHMINA and was resolved on admission. Creatinine level on this admission was 1.35>1.13>1.24>1.10. Theres no baseline creatinine documented.  When  the  creatinine  change does not meet [facility]’s policy based on  KDIGO criteria ,CDI will query the physician for additional criteria to meet the policy or give alternate choices for the diagnoses which may better describe the patient’s clinical picture, such as:  •	TAHMINA ( additional clinical information added to the medical record, as per TAHMINA Policy, i.e baseline creatinine )  •	Renal insufficiency ( acute)   •	Dehydration, no TAHMINA  •	Other   •	Unknown       Also noted that pt's PO4 level was low on 11/7 and was given K supplement   and multivitamins and resolved after .Please clarify and document the corresponding Dx for this  >hypophosphatemia?  >electrolyte imbalance?  >other or undetermined?        Thank You!                            CLINICAL INDICATORS:

## 2019-11-22 ENCOUNTER — APPOINTMENT (OUTPATIENT)
Dept: INTERNAL MEDICINE | Facility: CLINIC | Age: 49
End: 2019-11-22

## 2019-11-22 PROBLEM — Z00.00 ENCOUNTER FOR PREVENTIVE HEALTH EXAMINATION: Status: ACTIVE | Noted: 2019-11-22

## 2020-04-09 NOTE — H&P ADULT - PROBLEM SELECTOR PROBLEM 1
[>50% of Time Spent on Coordination of Care for  ___] : Greater than 50% of the encounter time was spent on coordination of care for [unfilled]
Chest pain

## 2020-05-14 NOTE — CONSULT NOTE ADULT - PROVIDER SPECIALTY LIST ADULT
[FreeTextEntry1] : Diagnoses #1 hypertension, stable .Continue with strict low salt diet.\par # #2 hyperlipidemia. Patient to continue with  low-fat diet.\par # 3 hypovitaminosis D., being supplemented.level to lab\par #4  gastritis GE reflux. Stable. Continue same  treatment-diet\par #5 probably benign tremor. Have  neurology consult\par Plan  Patient to continue with the current medications and plan as above.  Patient advised to return to office after 4 months and p.r.n. Cardiology

## 2020-07-02 NOTE — PATIENT PROFILE ADULT - NSPROGENDIFFINTUB_GEN_A_NUR
If you were PLEASED with your appointment with me today and your clinical experience, please fill out the survey requesting it at the .  And if any time you are mailed a survey to complete, please do so, as this helps my national survey input.     Thank you for your time in advance.    Dr. Nakita Rivera  My Facebook Health and Wellness Page:  Dr. [facility]      **REFERRAL SPECIALIST:  When given a referral, please CALL Megan Burgess:  [phone].        Please check your lab results via [employer] 48 hrs after you get labs done or please call if you have not received your test results in 2 weeks of them being completed.  Whenever you view your [employer] labs and your labs are NOT within the normal values, please send me an e-mail so we can discuss your labs sooner.      If any MRIs/CTs/ultrasounds/X-rays were ordered, please call our office for results the next business day.    [employer] is a powerful new Internet tool that is quick, convenient and confidential.  With [employer], you can view your results faster; communicate on-line with your [facility] physician's office; schedule many types of appointments; and find helpful healthcare links.    **Visit [email or web address] soon and often.  Sign up, take a quick tour, view important information, and stay in touch with [facility].  We're There When You Need Us.    Multivitamin/Adequate Vitamin D3 2000 International Units daily and Calcium 500 mg twice daily (dietary or supplement).  Starting at 40 (unless otherwise discussed w/ me), Yearly Mammogram:  Please call [phone] to schedule.  Dental visit every 6 months or as directed.  Optometry every 2 years or as directed.  Healthy eating and at least 150 mins of weekly Exercise.  Seatbelts always.   Condoms always as applicable:  Single/any risk for sexually transmitted infections.  Pap Smear due 21.      MyPlate.gov Smart Tracker     My Fitness pal [employer] with smartphone      Medication Cost  Resource:    Call [facility]:          FREE LIST OF MEDS:  Inquire.         $25 Gift card for each new script.     [employer] and [employer]:  A great way to save $ for prescription and over-the-counter medicines.   Consider these pharmacies:  [facility], [facility], [facility].    [employer]:  Can get free meds when No insurance/your insurance doesn't cover them.      **Go directly by Internet to the medication's specific pharmaceutical company:  May have the BEST discounts.         never intubated

## 2022-09-14 NOTE — ED PROVIDER NOTE - RESPIRATORY, MLM
Breath sounds clear and equal bilaterally. Cephalexin Counseling: I counseled the patient regarding use of cephalexin as an antibiotic for prophylactic and/or therapeutic purposes. Cephalexin (commonly prescribed under brand name Keflex) is a cephalosporin antibiotic which is active against numerous classes of bacteria, including most skin bacteria. Side effects may include nausea, diarrhea, gastrointestinal upset, rash, hives, yeast infections, and in rare cases, hepatitis, kidney disease, seizures, fever, confusion, neurologic symptoms, and others. Patients with severe allergies to penicillin medications are cautioned that there is about a 10% incidence of cross-reactivity with cephalosporins. When possible, patients with penicillin allergies should use alternatives to cephalosporins for antibiotic therapy.
